# Patient Record
Sex: MALE | Race: BLACK OR AFRICAN AMERICAN | URBAN - METROPOLITAN AREA
[De-identification: names, ages, dates, MRNs, and addresses within clinical notes are randomized per-mention and may not be internally consistent; named-entity substitution may affect disease eponyms.]

---

## 2021-08-13 ENCOUNTER — HOSPITAL ENCOUNTER (OUTPATIENT)
Age: 76
Setting detail: SPECIMEN
Discharge: HOME | End: 2021-08-13
Payer: MEDICARE

## 2021-08-23 LAB — SURGICAL PATHOLOGY REPORT: NORMAL

## 2023-01-23 ENCOUNTER — HOSPITAL ENCOUNTER (OUTPATIENT)
Dept: VASCULAR LAB | Age: 78
Discharge: HOME | End: 2023-01-23
Payer: MEDICARE

## 2023-01-23 DIAGNOSIS — I65.23 BILATERAL CAROTID ARTERY STENOSIS: ICD-10-CM

## 2023-01-23 PROCEDURE — 93880 EXTRACRANIAL BILAT STUDY: CPT

## 2023-01-31 ENCOUNTER — HOSPITAL ENCOUNTER (OUTPATIENT)
Dept: GENERAL RADIOLOGY | Age: 78
Discharge: HOME | End: 2023-02-02
Payer: MEDICARE

## 2023-01-31 ENCOUNTER — HOSPITAL ENCOUNTER (OUTPATIENT)
Dept: PREADMISSION TESTING | Age: 78
Discharge: HOME | End: 2023-02-04
Payer: MEDICARE

## 2023-01-31 VITALS
HEIGHT: 66 IN | RESPIRATION RATE: 14 BRPM | BODY MASS INDEX: 26.84 KG/M2 | TEMPERATURE: 98.7 F | HEART RATE: 73 BPM | SYSTOLIC BLOOD PRESSURE: 130 MMHG | OXYGEN SATURATION: 100 % | DIASTOLIC BLOOD PRESSURE: 86 MMHG | WEIGHT: 167 LBS

## 2023-01-31 LAB
ABO/RH: NORMAL
ABSOLUTE EOS #: 0.14 K/UL (ref 0–0.44)
ABSOLUTE IMMATURE GRANULOCYTE: 0 K/UL (ref 0–0.3)
ABSOLUTE LYMPH #: 0.61 K/UL (ref 1.1–3.7)
ABSOLUTE MONO #: 0.31 K/UL (ref 0.1–1.2)
ALBUMIN SERPL-MCNC: 4.6 G/DL (ref 3.5–5.2)
ALP BLD-CCNC: 74 U/L (ref 40–129)
ALT SERPL-CCNC: 8 U/L (ref 5–41)
ANION GAP SERPL CALCULATED.3IONS-SCNC: 9 MMOL/L (ref 9–17)
ANTIBODY SCREEN: NEGATIVE
ARM BAND NUMBER: NORMAL
AST SERPL-CCNC: 14 U/L
BASOPHILS # BLD: 1 % (ref 0–2)
BASOPHILS ABSOLUTE: 0.03 K/UL (ref 0–0.2)
BILIRUB SERPL-MCNC: 0.3 MG/DL (ref 0.3–1.2)
BILIRUBIN URINE: NEGATIVE
BUN BLDV-MCNC: 7 MG/DL (ref 8–23)
BUN/CREAT BLD: 9 (ref 9–20)
CALCIUM SERPL-MCNC: 9 MG/DL (ref 8.6–10.4)
CHLORIDE BLD-SCNC: 96 MMOL/L (ref 98–107)
CO2: 28 MMOL/L (ref 20–31)
COLOR: YELLOW
CREAT SERPL-MCNC: 0.74 MG/DL (ref 0.7–1.2)
EOSINOPHILS RELATIVE PERCENT: 4 % (ref 1–4)
ESTIMATED AVERAGE GLUCOSE: 108 MG/DL
EXPIRATION DATE: NORMAL
GFR SERPL CREATININE-BSD FRML MDRD: >60 ML/MIN/1.73M2
GLUCOSE BLD-MCNC: 113 MG/DL (ref 75–110)
GLUCOSE BLD-MCNC: 53 MG/DL (ref 70–99)
GLUCOSE URINE: NEGATIVE
HBA1C MFR BLD: 5.4 % (ref 4–6)
HCT VFR BLD CALC: 44.4 % (ref 40.7–50.3)
HEMOGLOBIN: 13.3 G/DL (ref 13–17)
IMMATURE GRANULOCYTES: 0 %
INR BLD: 1.1
KETONES, URINE: NEGATIVE
LEUKOCYTE ESTERASE, URINE: NEGATIVE
LYMPHOCYTES # BLD: 18 % (ref 24–43)
MCH RBC QN AUTO: 23.9 PG (ref 25.2–33.5)
MCHC RBC AUTO-ENTMCNC: 30 G/DL (ref 28.4–34.8)
MCV RBC AUTO: 79.9 FL (ref 82.6–102.9)
MONOCYTES # BLD: 9 % (ref 3–12)
MORPHOLOGY: ABNORMAL
NITRITE, URINE: NEGATIVE
NRBC AUTOMATED: 0 PER 100 WBC
PARTIAL THROMBOPLASTIN TIME: 29.3 SEC (ref 23.9–33.8)
PDW BLD-RTO: 21.2 % (ref 11.8–14.4)
PH UA: 6.5 (ref 5–8)
PLATELET # BLD: 158 K/UL (ref 138–453)
PMV BLD AUTO: 9.4 FL (ref 8.1–13.5)
POTASSIUM SERPL-SCNC: 4.5 MMOL/L (ref 3.7–5.3)
PROTEIN UA: NEGATIVE
PROTHROMBIN TIME: 14.1 SEC (ref 11.5–14.2)
RBC # BLD: 5.56 M/UL (ref 4.21–5.77)
SEG NEUTROPHILS: 68 % (ref 36–65)
SEGMENTED NEUTROPHILS ABSOLUTE COUNT: 2.31 K/UL (ref 1.5–8.1)
SODIUM BLD-SCNC: 133 MMOL/L (ref 135–144)
SPECIFIC GRAVITY UA: 1.01 (ref 1–1.03)
TOTAL PROTEIN: 7.7 G/DL (ref 6.4–8.3)
TURBIDITY: CLEAR
URINE HGB: NEGATIVE
UROBILINOGEN, URINE: NORMAL
WBC # BLD: 3.4 K/UL (ref 3.5–11.3)

## 2023-01-31 PROCEDURE — 86900 BLOOD TYPING SEROLOGIC ABO: CPT

## 2023-01-31 PROCEDURE — 71046 X-RAY EXAM CHEST 2 VIEWS: CPT

## 2023-01-31 PROCEDURE — 82947 ASSAY GLUCOSE BLOOD QUANT: CPT

## 2023-01-31 PROCEDURE — 83036 HEMOGLOBIN GLYCOSYLATED A1C: CPT

## 2023-01-31 PROCEDURE — 80053 COMPREHEN METABOLIC PANEL: CPT

## 2023-01-31 PROCEDURE — 85025 COMPLETE CBC W/AUTO DIFF WBC: CPT

## 2023-01-31 PROCEDURE — 86901 BLOOD TYPING SEROLOGIC RH(D): CPT

## 2023-01-31 PROCEDURE — 86850 RBC ANTIBODY SCREEN: CPT

## 2023-01-31 PROCEDURE — 36415 COLL VENOUS BLD VENIPUNCTURE: CPT

## 2023-01-31 PROCEDURE — 85610 PROTHROMBIN TIME: CPT

## 2023-01-31 PROCEDURE — 81003 URINALYSIS AUTO W/O SCOPE: CPT

## 2023-01-31 PROCEDURE — 85730 THROMBOPLASTIN TIME PARTIAL: CPT

## 2023-01-31 RX ORDER — FAMOTIDINE 40 MG/1
1 TABLET, FILM COATED ORAL NIGHTLY PRN
COMMUNITY
Start: 2023-01-18

## 2023-01-31 RX ORDER — DULOXETIN HYDROCHLORIDE 30 MG/1
30 CAPSULE, DELAYED RELEASE ORAL NIGHTLY
COMMUNITY

## 2023-01-31 RX ORDER — PAROXETINE HYDROCHLORIDE 20 MG/1
1 TABLET, FILM COATED ORAL DAILY
COMMUNITY
Start: 2023-01-27

## 2023-01-31 RX ORDER — LISINOPRIL 40 MG/1
40 TABLET ORAL DAILY
COMMUNITY

## 2023-01-31 RX ORDER — TAMSULOSIN HYDROCHLORIDE 0.4 MG/1
0.8 CAPSULE ORAL DAILY
COMMUNITY
Start: 2014-09-02

## 2023-01-31 RX ORDER — TRAZODONE HYDROCHLORIDE 50 MG/1
50 TABLET ORAL NIGHTLY
COMMUNITY
Start: 2022-03-17

## 2023-01-31 RX ORDER — OMEPRAZOLE 40 MG/1
1 CAPSULE, DELAYED RELEASE ORAL DAILY
COMMUNITY
Start: 2022-12-11

## 2023-01-31 RX ORDER — ALPRAZOLAM 0.25 MG/1
0.25 TABLET ORAL NIGHTLY PRN
COMMUNITY

## 2023-01-31 RX ORDER — ACETAMINOPHEN 325 MG/1
650 TABLET ORAL EVERY 6 HOURS PRN
COMMUNITY

## 2023-01-31 RX ORDER — NITROGLYCERIN 0.4 MG/1
0.4 TABLET SUBLINGUAL EVERY 5 MIN PRN
COMMUNITY

## 2023-01-31 RX ORDER — DOXYCYCLINE HYCLATE 50 MG/1
1 CAPSULE, GELATIN COATED ORAL DAILY
COMMUNITY
Start: 2023-01-06

## 2023-01-31 RX ORDER — AMLODIPINE BESYLATE 10 MG/1
10 TABLET ORAL DAILY
COMMUNITY

## 2023-01-31 RX ORDER — SIMVASTATIN 40 MG
40 TABLET ORAL NIGHTLY
COMMUNITY

## 2023-01-31 RX ORDER — ESCITALOPRAM OXALATE 10 MG/1
1 TABLET ORAL DAILY
COMMUNITY
Start: 2023-01-23

## 2023-01-31 RX ORDER — OXYBUTYNIN CHLORIDE 5 MG/1
5 TABLET, EXTENDED RELEASE ORAL DAILY
COMMUNITY
Start: 2022-07-22

## 2023-01-31 RX ORDER — POTASSIUM CHLORIDE 750 MG/1
10 TABLET, EXTENDED RELEASE ORAL DAILY
COMMUNITY
Start: 2022-07-22

## 2023-01-31 RX ORDER — FUROSEMIDE 20 MG/1
20 TABLET ORAL DAILY
COMMUNITY
Start: 2022-07-22

## 2023-01-31 RX ORDER — ASPIRIN 81 MG/1
81 TABLET ORAL DAILY
COMMUNITY
Start: 2022-03-17

## 2023-01-31 RX ORDER — DICYCLOMINE HYDROCHLORIDE 10 MG/1
1 CAPSULE ORAL 3 TIMES DAILY PRN
COMMUNITY
Start: 2023-01-18

## 2023-01-31 RX ORDER — METOPROLOL SUCCINATE 100 MG/1
100 TABLET, EXTENDED RELEASE ORAL DAILY
COMMUNITY
Start: 2022-07-22

## 2023-01-31 NOTE — PROGRESS NOTES
Patient's blood glucose resulted at 53 on lab work performed in [facility]. Patient denies symptoms of hypoglycemia. Patient given a bagel and lemonade, and blood glucose rechecked 30 minutes later with a result of 113.

## 2023-01-31 NOTE — PRE-PROCEDURE INSTRUCTIONS
On the Day of Your Surgery, Tuesday, February 14, 2023, Please Arrive At 10:20 AM     Enter the hospital through the Main Entrance, take the lobby elevators to the second floor and check in at the Surgery Registration desk. Continue to take your home medications as you normally do up to and including the night before surgery with the exception of blood thinning medications. Blood Thinning Medications:  Please stop prescription blood thinning medications such as Apixaban (Eliquis); Clopidogrel (Plavix); Dabigatran (Pradaxa); Prasugrel (Effient); Rivaroxaban (Xarelto); Ticagrelor (Brilinta); Warfarin (Coumadin) only as directed by your surgeon and/or the prescribing physician    Some common examples of other medications that can thin your blood are: Aspirin, Ibuprofen (Advil, Motrin), Naproxen (Aleve), Meloxicam (Mobic), Celecoxib (Celebrex), Fish Oil, many Herbal Supplements. These medications should usually be stopped at least 7 days prior to surgery. Please call Dr. El Sales office regarding when to stop taking aspirin prior to surgery. Tylenol is OK to take for pain the week prior to surgery. Failure to stop certain medications may interfere with your scheduled surgery. If you receive instructions from your surgeon regarding what medications to stop prior to surgery, please follow those specific instructions. If You Have Diabetes:  Do not take any of your diabetic medications, (injectables or by mouth) the morning of surgery unless otherwise instructed by the doctor who manages your diabetes. If you are taking insulin, contact the doctor the manages your diabetes for instructions about any changes to your insulin dosages the day before surgery. Please take the following medication(s) the day of surgery with small sips of water:              Escitalopram, paroxetine, metoprolol, amlodipine, omeprazole    Showering Before Surgery:      You can play an important role in your own health by carefully washing before surgery. Shower the night before and the morning of surgery using the instructions below. If you are allergic to Chlorhexidine Gluconate (CHG) use antibacterial soap instead. If you were given Chlorhexidine soap, please follow the instructions included with the soap to shower the night before and the morning of surgery. If you were not given Chlorhexidine, please shower or bathe the morning of surgery using an antibacterial soap. Please dress in clean clothing after showering. General information about Chlorhexidine Gluconate (CHG)   * It should not be used on hair, face, ears, genital area or skin that is not intact. * It should not be used if breast feeding. * It should not be used if you allergic to CHG. * See the bottle for additional information. Do Not apply any powder, deodorant, lotion/cream/oil, perfume/aftershave, cosmetics, or alcohol-based skin or hair products after showering. Do Not shave near the planned surgical site unless specifically instructed to.     1. Wash your hair using your normal shampoo and rinse. 2. Wash your face and genital area (privates) using your own shampoo and rinse. 3. Turn off the shower water and pour half of the bottle of CHG onto a clean washcloth. 4. Wash your body from neck down to toes. Pay special attention to your surgical site. 5. Leave the CHG on your skin for 5 minutes and rinse it off.   6. Pat dry with a clean towel, sleep in clean clothes and clean sheets. 7. Do not shave near the surgical site. 8. Repeat process the morning of surgery. CHG may cause dry skin, but should not cause redness, rash, or intense itching. If an suspect an allergic reaction, use your own soap and shampoo for the morning of surgery and report reaction to the pre-operative nurse. Additional Instructions:      1.  If you are having any type of anesthesia, you are to have NOTHING to eat or drink after midnight the night before surgery.  This includes no gum, hard candy, mints or water.  The only exception to this is small sips of water to take the medications listed above.  No smoking or chewing tobacco after midnight.  No alcoholic beverages for 24 hours prior to surgery.  2. Brush your teeth but do not swallow any water.  3. If you wear glasses bring a case for them if you have one.  No contacts should be worn the day of surgery.  You may also bring your hearing aids. If you have dentures, most surgical procedures involving anesthesia will require that you remove them prior to surgery.  4. If you sleep with a CPAP or BiPAP machine at home and plan on staying in the hospital overnight after surgery, please bring your machine with you.   5. Do not wear any jewelry or body piercings the day of surgery.  No nail polish on the operative extremity (arm/hand or leg/foot surgeries)   6. If you are staying overnight with us, you may bring a small bag of necessary personal items. Please bring your home medications with you in their original labeled containers.   7. Please wear loose, comfortable clothing.  If you are potentially going to have a cast, sling, brace or bulky dressing, make sure to wear clothing that will fit over it.   8. In case of illness - If you have cold or flu like symptoms (high fever, runny nose, sore throat, cough, etc.) rash, nausea, vomiting, loose stools, and/or recent contact with someone who has a contagious disease (chicken pox, measles, COVID-19, etc.).  Please call your surgeon before coming to the hospital.    Transportation After Your Surgery/Procedure:     If you are going home the same day of surgery you need someone to drive you home.  Your  must be at least 18 years of age.  A taxi cab or other nonmedical public transportation is not acceptable unless you have someone to ride home in the vehicle with you.   For your safety, someone must remain with you for the first 24 hours after your surgery if  you receive anesthesia or medication. If you do not have someone to stay with you, your procedure may be cancelled. As a patient at [address] you can expect quality medical and nursing care that is centered on you individual needs. Our goal is to make your surgical experience as comfortable as possible.     Any questions about preparing for your surgery please call [phone].      ____________________________   ____________________________  Signature (Patient)                                 Signature (Nurse)                     Date

## 2023-01-31 NOTE — PROGRESS NOTES
PAT Progress Note    Pt Name: Robin Bojorquez  MRN: [id number]  YOB: 1945  Date of evaluation: 1/31/2023      [x] Called to PAT. I spoke to the patient, Robin Bojorquez, a 68 y.o. male, who is scheduled for an upcoming ABDOMINAL AORTIC ANEURYSM REPAIR ENDOVASCULAR by Nandini Lion MD for Abdominal aortic aneurysm (AAA) without rupture, unspecified part [I71.40] on 2/14/2023 at 1220. [x] I reviewed the hard copy vascular progress note by Dr. Hamzah Teran dated 1/16/2023 for an Interval History and Physical Note the day of surgery. History of congestive heart failure, coronary artery disease, hypertension, hyperlipidemia, diabetes, stent to LAD (2007). Patient follows with cardiologist Dr. Caron Byrd and evaluated 12/2022. Patient denies shortness of breath, chest pain, palpitations, dizziness. Patient had low blood sugar of 53 today in PAT. Patient given food and drink. Recheck blood sugar is 113. Patient asymptomatic. Functional Capacity per pt:  1) Pt is able to walk 2 city blocks on level ground without SOB. 2) Pt is not able to climb 2 flights of stairs without SOB. Vital signs: /86   Pulse 73   Temp 98.7 °F (37.1 °C) (Infrared)   Resp 14   Ht 5' 6\" (1.676 m)   Wt 167 lb (75.8 kg)   SpO2 100%   BMI 26.95 kg/m²     Physical Exam:     General Appearance:  Alert, well appearing, and in no acute distress. Mental status:  Oriented to person, place, and time. Lungs:  Bilateral equal air entry, clear to auscultation, no wheezing, rales or rhonchi, and normal effort. Cardiovascular:  Normal rate, regular rhythm, no murmur, gallop, or rub.     Past Medical History:     Past Medical History:   Diagnosis Date    AAA (abdominal aortic aneurysm)     Scheduled for surgery 2/14/23    Anxiety with depression     On Rx; wife passed away in June and has had issues with this since    Arthritis     Knees, foot    Kirby's esophagus     BPH (benign prostatic hyperplasia)     CAD (coronary artery disease) Diabetes mellitus ([facility] [place] [facility]     Type 2, checks blood sugar twice daily, managed by PCP    Dizziness     Usually in the morning    History of blood transfusion 2022    [facility]; patient states he was admitted after a syncopal episode and received blood transfusions    Hospitalization or health care facility admission within last 6 months 09/2022    [facility] (blood in stool)    Hyperlipidemia     Hypertension     IBS (irritable bowel syndrome)     Alternates constipation and diarrhea    Neuropathy     Left leg and foot d/t herniated disc in spine per pt.     Nodule of middle lobe of right lung 10/26/2022    5mm (found on CT)    Under care of team     Cardiology - Dr. Mauro Eid        Investigations:      Laboratory Testing:  Recent Results (from the past 24 hour(s))   Urinalysis with Reflex to Culture    Collection Time: 01/31/23 10:30 AM    Specimen: Urine   Result Value Ref Range    Color, UA Yellow Yellow    Turbidity UA Clear Clear    Glucose, Ur NEGATIVE NEGATIVE    Bilirubin Urine NEGATIVE NEGATIVE    Ketones, Urine NEGATIVE NEGATIVE    Specific Gravity, UA 1.006 1.005 - 1.030    Urine Hgb NEGATIVE NEGATIVE    pH, UA 6.5 5.0 - 8.0    Protein, UA NEGATIVE NEGATIVE    Urobilinogen, Urine Normal Normal    Nitrite, Urine NEGATIVE NEGATIVE    Leukocyte Esterase, Urine NEGATIVE NEGATIVE   CBC with Auto Differential    Collection Time: 01/31/23 10:42 AM   Result Value Ref Range    WBC 3.4 (L) 3.5 - 11.3 k/uL    RBC 5.56 4.21 - 5.77 m/uL    Hemoglobin 13.3 13.0 - 17.0 g/dL    Hematocrit 44.4 40.7 - 50.3 %    MCV 79.9 (L) 82.6 - 102.9 fL    MCH 23.9 (L) 25.2 - 33.5 pg    MCHC 30.0 28.4 - 34.8 g/dL    RDW 21.2 (H) 11.8 - 14.4 %    Platelets 090 910 - 579 k/uL    MPV 9.4 8.1 - 13.5 fL    NRBC Automated 0.0 0.0 per 100 WBC    Seg Neutrophils 68 (H) 36 - 65 %    Lymphocytes 18 (L) 24 - 43 %    Monocytes 9 3 - 12 %    Eosinophils % 4 1 - 4 %    Basophils 1 0 - 2 %    Immature Granulocytes 0 0 %    Segs Absolute 2.31 1.50 - 8.10 k/uL Absolute Lymph # 0.61 (L) 1.10 - 3.70 k/uL    Absolute Mono # 0.31 0.10 - 1.20 k/uL    Absolute Eos # 0.14 0.00 - 0.44 k/uL    Basophils Absolute 0.03 0.00 - 0.20 k/uL    Absolute Immature Granulocyte 0.00 0.00 - 0.30 k/uL    Morphology ANISOCYTOSIS PRESENT    Protime-INR    Collection Time: 01/31/23 10:42 AM   Result Value Ref Range    Protime 14.1 11.5 - 14.2 sec    INR 1.1    APTT    Collection Time: 01/31/23 10:42 AM   Result Value Ref Range    PTT 29.3 23.9 - 33.8 sec   Comprehensive Metabolic Panel    Collection Time: 01/31/23 10:42 AM   Result Value Ref Range    Glucose 53 (L) 70 - 99 mg/dL    BUN 7 (L) 8 - 23 mg/dL    Creatinine 0.74 0.70 - 1.20 mg/dL    Est, Glom Filt Rate >60 >60 mL/min/1.73m2    Bun/Cre Ratio 9 9 - 20    Calcium 9.0 8.6 - 10.4 mg/dL    Sodium 133 (L) 135 - 144 mmol/L    Potassium 4.5 3.7 - 5.3 mmol/L    Chloride 96 (L) 98 - 107 mmol/L    CO2 28 20 - 31 mmol/L    Anion Gap 9 9 - 17 mmol/L    Alkaline Phosphatase 74 40 - 129 U/L    ALT 8 5 - 41 U/L    AST 14 <40 U/L    Total Bilirubin 0.3 0.3 - 1.2 mg/dL    Total Protein 7.7 6.4 - 8.3 g/dL    Albumin 4.6 3.5 - 5.2 g/dL   POC Glucose Fingerstick    Collection Time: 01/31/23 11:44 AM   Result Value Ref Range    POC Glucose 113 (H) 75 - 110 mg/dL       Recent Labs     01/31/23  1042   HGB 13.3   HCT 44.4   WBC 3.4*   MCV 79.9*   *   K 4.5   CL 96*   CO2 28   BUN 7*   CREATININE 0.74   GLUCOSE 53*   INR 1.1   PROTIME 14.1   APTT 29.3   AST 14   ALT 8   LABALBU 4.6       No results for input(s): COVID19 in the last 720 hours.     DURAN Johns - CNP    Electronically signed 1/31/2023 at 12:56 PM

## 2023-02-01 ENCOUNTER — ANESTHESIA EVENT (OUTPATIENT)
Dept: OPERATING ROOM | Age: 78
DRG: 272 | End: 2023-02-01
Payer: MEDICARE

## 2023-02-14 ENCOUNTER — HOSPITAL ENCOUNTER (INPATIENT)
Age: 78
LOS: 1 days | Discharge: HOME | DRG: 272 | End: 2023-02-15
Attending: SURGERY | Admitting: SURGERY
Payer: MEDICARE

## 2023-02-14 ENCOUNTER — ANESTHESIA (OUTPATIENT)
Dept: OPERATING ROOM | Age: 78
DRG: 272 | End: 2023-02-14
Payer: MEDICARE

## 2023-02-14 PROBLEM — I72.3 ILIAC ARTERY ANEURYSM, LEFT (HCC): Status: ACTIVE | Noted: 2023-02-14

## 2023-02-14 LAB
GLUCOSE BLD-MCNC: 115 MG/DL (ref 75–110)
GLUCOSE BLD-MCNC: 144 MG/DL (ref 75–110)
HCT VFR BLD AUTO: 43.6 % (ref 40.7–50.3)
HGB BLD-MCNC: 13.1 G/DL (ref 13–17)
MCH RBC QN AUTO: 25 PG (ref 25.2–33.5)
MCHC RBC AUTO-ENTMCNC: 30 G/DL (ref 28.4–34.8)
MCV RBC AUTO: 83.4 FL (ref 82.6–102.9)
NRBC AUTOMATED: 0 PER 100 WBC
PDW BLD-RTO: 21.1 % (ref 11.8–14.4)
PLATELET # BLD AUTO: 134 K/UL (ref 138–453)
PMV BLD AUTO: 10.5 FL (ref 8.1–13.5)
RBC # BLD: 5.23 M/UL (ref 4.21–5.77)
WBC # BLD AUTO: 6.1 K/UL (ref 3.5–11.3)

## 2023-02-14 PROCEDURE — 2780000010 HC IMPLANT OTHER: Performed by: SURGERY

## 2023-02-14 PROCEDURE — C1874 STENT, COATED/COV W/DEL SYS: HCPCS | Performed by: SURGERY

## 2023-02-14 PROCEDURE — 82947 ASSAY GLUCOSE BLOOD QUANT: CPT

## 2023-02-14 PROCEDURE — 6360000002 HC RX W HCPCS: Performed by: SURGERY

## 2023-02-14 PROCEDURE — 6370000000 HC RX 637 (ALT 250 FOR IP): Performed by: NURSE PRACTITIONER

## 2023-02-14 PROCEDURE — 2709999900 HC NON-CHARGEABLE SUPPLY: Performed by: SURGERY

## 2023-02-14 PROCEDURE — 6360000002 HC RX W HCPCS: Performed by: NURSE ANESTHETIST, CERTIFIED REGISTERED

## 2023-02-14 PROCEDURE — 7100000000 HC PACU RECOVERY - FIRST 15 MIN: Performed by: SURGERY

## 2023-02-14 PROCEDURE — 2580000003 HC RX 258: Performed by: STUDENT IN AN ORGANIZED HEALTH CARE EDUCATION/TRAINING PROGRAM

## 2023-02-14 PROCEDURE — A4217 STERILE WATER/SALINE, 500 ML: HCPCS | Performed by: SURGERY

## 2023-02-14 PROCEDURE — 047D3ZZ DILATION OF LEFT COMMON ILIAC ARTERY, PERCUTANEOUS APPROACH: ICD-10-PCS | Performed by: SURGERY

## 2023-02-14 PROCEDURE — 2500000003 HC RX 250 WO HCPCS: Performed by: NURSE ANESTHETIST, CERTIFIED REGISTERED

## 2023-02-14 PROCEDURE — 36415 COLL VENOUS BLD VENIPUNCTURE: CPT

## 2023-02-14 PROCEDURE — 85027 COMPLETE CBC AUTOMATED: CPT

## 2023-02-14 PROCEDURE — 6360000002 HC RX W HCPCS: Performed by: ANESTHESIOLOGY

## 2023-02-14 PROCEDURE — C1725 CATH, TRANSLUMIN NON-LASER: HCPCS | Performed by: SURGERY

## 2023-02-14 PROCEDURE — C1768 GRAFT, VASCULAR: HCPCS | Performed by: SURGERY

## 2023-02-14 PROCEDURE — 3600000002 HC SURGERY LEVEL 2 BASE: Performed by: SURGERY

## 2023-02-14 PROCEDURE — 3700000000 HC ANESTHESIA ATTENDED CARE: Performed by: SURGERY

## 2023-02-14 PROCEDURE — 6360000002 HC RX W HCPCS

## 2023-02-14 PROCEDURE — C1894 INTRO/SHEATH, NON-LASER: HCPCS | Performed by: SURGERY

## 2023-02-14 PROCEDURE — C1889 IMPLANT/INSERT DEVICE, NOC: HCPCS | Performed by: SURGERY

## 2023-02-14 PROCEDURE — 6360000004 HC RX CONTRAST MEDICATION

## 2023-02-14 PROCEDURE — C1773 RET DEV, INSERTABLE: HCPCS | Performed by: SURGERY

## 2023-02-14 PROCEDURE — 2000000000 HC ICU R&B

## 2023-02-14 PROCEDURE — 3600000012 HC SURGERY LEVEL 2 ADDTL 15MIN: Performed by: SURGERY

## 2023-02-14 PROCEDURE — 6370000000 HC RX 637 (ALT 250 FOR IP): Performed by: SURGERY

## 2023-02-14 PROCEDURE — C1760 CLOSURE DEV, VASC: HCPCS | Performed by: SURGERY

## 2023-02-14 PROCEDURE — C1769 GUIDE WIRE: HCPCS | Performed by: SURGERY

## 2023-02-14 PROCEDURE — B41J1ZZ FLUOROSCOPY OF OTHER LOWER ARTERIES USING LOW OSMOLAR CONTRAST: ICD-10-PCS | Performed by: SURGERY

## 2023-02-14 PROCEDURE — C1887 CATHETER, GUIDING: HCPCS | Performed by: SURGERY

## 2023-02-14 PROCEDURE — 6360000004 HC RX CONTRAST MEDICATION: Performed by: SURGERY

## 2023-02-14 PROCEDURE — 7100000001 HC PACU RECOVERY - ADDTL 15 MIN: Performed by: SURGERY

## 2023-02-14 PROCEDURE — 2580000003 HC RX 258: Performed by: SURGERY

## 2023-02-14 PROCEDURE — 2580000003 HC RX 258: Performed by: NURSE ANESTHETIST, CERTIFIED REGISTERED

## 2023-02-14 PROCEDURE — 2720000010 HC SURG SUPPLY STERILE: Performed by: SURGERY

## 2023-02-14 PROCEDURE — 04LD3DZ OCCLUSION OF LEFT COMMON ILIAC ARTERY WITH INTRALUMINAL DEVICE, PERCUTANEOUS APPROACH: ICD-10-PCS | Performed by: SURGERY

## 2023-02-14 PROCEDURE — 3700000001 HC ADD 15 MINUTES (ANESTHESIA): Performed by: SURGERY

## 2023-02-14 DEVICE — FIBERED IDC™ OCCLUSION SYSTEM
Type: IMPLANTABLE DEVICE | Site: GROIN | Status: FUNCTIONAL
Brand: INTERLOCK™-35

## 2023-02-14 DEVICE — GRAFT EVAR L103MM DIA25X14MM CATH 18FR NIT HI DENS: Type: IMPLANTABLE DEVICE | Site: GROIN | Status: FUNCTIONAL

## 2023-02-14 DEVICE — GRAFT EVAR 14FR L124MM DIA16X10MM HI DENS MULTIFILAMENT: Type: IMPLANTABLE DEVICE | Site: GROIN | Status: FUNCTIONAL

## 2023-02-14 DEVICE — IMPLANTABLE DEVICE: Type: IMPLANTABLE DEVICE | Site: GROIN | Status: FUNCTIONAL

## 2023-02-14 RX ORDER — ALPRAZOLAM 0.25 MG/1
0.25 TABLET ORAL NIGHTLY PRN
Status: DISCONTINUED | OUTPATIENT
Start: 2023-02-14 | End: 2023-02-14

## 2023-02-14 RX ORDER — NITROGLYCERIN 0.4 MG/1
0.4 TABLET SUBLINGUAL EVERY 5 MIN PRN
Status: DISCONTINUED | OUTPATIENT
Start: 2023-02-14 | End: 2023-02-15 | Stop reason: HOSPADM

## 2023-02-14 RX ORDER — SODIUM CHLORIDE 9 MG/ML
INJECTION, SOLUTION INTRAVENOUS PRN
Status: DISCONTINUED | OUTPATIENT
Start: 2023-02-14 | End: 2023-02-14 | Stop reason: HOSPADM

## 2023-02-14 RX ORDER — SODIUM CHLORIDE 9 MG/ML
INJECTION, SOLUTION INTRAVENOUS PRN
Status: DISCONTINUED | OUTPATIENT
Start: 2023-02-14 | End: 2023-02-15 | Stop reason: HOSPADM

## 2023-02-14 RX ORDER — SODIUM CHLORIDE 0.9 % (FLUSH) 0.9 %
5-40 SYRINGE (ML) INJECTION EVERY 12 HOURS SCHEDULED
Status: DISCONTINUED | OUTPATIENT
Start: 2023-02-14 | End: 2023-02-14 | Stop reason: HOSPADM

## 2023-02-14 RX ORDER — OXYCODONE HYDROCHLORIDE 5 MG/1
5 TABLET ORAL
Status: DISCONTINUED | OUTPATIENT
Start: 2023-02-14 | End: 2023-02-14 | Stop reason: HOSPADM

## 2023-02-14 RX ORDER — POTASSIUM CHLORIDE 7.45 MG/ML
10 INJECTION INTRAVENOUS PRN
Status: DISCONTINUED | OUTPATIENT
Start: 2023-02-14 | End: 2023-02-15 | Stop reason: HOSPADM

## 2023-02-14 RX ORDER — LIDOCAINE HYDROCHLORIDE 20 MG/ML
INJECTION, SOLUTION EPIDURAL; INFILTRATION; INTRACAUDAL; PERINEURAL PRN
Status: DISCONTINUED | OUTPATIENT
Start: 2023-02-14 | End: 2023-02-14 | Stop reason: SDUPTHER

## 2023-02-14 RX ORDER — SODIUM CHLORIDE 9 MG/ML
INJECTION, SOLUTION INTRAVENOUS CONTINUOUS PRN
Status: DISCONTINUED | OUTPATIENT
Start: 2023-02-14 | End: 2023-02-14 | Stop reason: SDUPTHER

## 2023-02-14 RX ORDER — MAGNESIUM SULFATE 1 G/100ML
1000 INJECTION INTRAVENOUS PRN
Status: DISCONTINUED | OUTPATIENT
Start: 2023-02-14 | End: 2023-02-15 | Stop reason: HOSPADM

## 2023-02-14 RX ORDER — LIDOCAINE HYDROCHLORIDE 10 MG/ML
1 INJECTION, SOLUTION EPIDURAL; INFILTRATION; INTRACAUDAL; PERINEURAL
Status: DISCONTINUED | OUTPATIENT
Start: 2023-02-14 | End: 2023-02-14 | Stop reason: HOSPADM

## 2023-02-14 RX ORDER — SODIUM CHLORIDE 0.9 % (FLUSH) 0.9 %
5-40 SYRINGE (ML) INJECTION PRN
Status: DISCONTINUED | OUTPATIENT
Start: 2023-02-14 | End: 2023-02-15 | Stop reason: HOSPADM

## 2023-02-14 RX ORDER — POTASSIUM CHLORIDE 20 MEQ/1
40 TABLET, EXTENDED RELEASE ORAL PRN
Status: DISCONTINUED | OUTPATIENT
Start: 2023-02-14 | End: 2023-02-15 | Stop reason: HOSPADM

## 2023-02-14 RX ORDER — HYDROMORPHONE HYDROCHLORIDE 1 MG/ML
0.25 INJECTION, SOLUTION INTRAMUSCULAR; INTRAVENOUS; SUBCUTANEOUS EVERY 5 MIN PRN
Status: DISCONTINUED | OUTPATIENT
Start: 2023-02-14 | End: 2023-02-14 | Stop reason: HOSPADM

## 2023-02-14 RX ORDER — SODIUM CHLORIDE, SODIUM LACTATE, POTASSIUM CHLORIDE, CALCIUM CHLORIDE 600; 310; 30; 20 MG/100ML; MG/100ML; MG/100ML; MG/100ML
INJECTION, SOLUTION INTRAVENOUS CONTINUOUS
Status: DISCONTINUED | OUTPATIENT
Start: 2023-02-14 | End: 2023-02-15 | Stop reason: HOSPADM

## 2023-02-14 RX ORDER — SODIUM CHLORIDE, SODIUM LACTATE, POTASSIUM CHLORIDE, CALCIUM CHLORIDE 600; 310; 30; 20 MG/100ML; MG/100ML; MG/100ML; MG/100ML
INJECTION, SOLUTION INTRAVENOUS CONTINUOUS
Status: DISCONTINUED | OUTPATIENT
Start: 2023-02-14 | End: 2023-02-14

## 2023-02-14 RX ORDER — PROPOFOL 10 MG/ML
INJECTION, EMULSION INTRAVENOUS PRN
Status: DISCONTINUED | OUTPATIENT
Start: 2023-02-14 | End: 2023-02-14 | Stop reason: SDUPTHER

## 2023-02-14 RX ORDER — DICYCLOMINE HYDROCHLORIDE 10 MG/1
10 CAPSULE ORAL 3 TIMES DAILY PRN
Status: DISCONTINUED | OUTPATIENT
Start: 2023-02-14 | End: 2023-02-15 | Stop reason: HOSPADM

## 2023-02-14 RX ORDER — PROTAMINE SULFATE 10 MG/ML
INJECTION, SOLUTION INTRAVENOUS PRN
Status: DISCONTINUED | OUTPATIENT
Start: 2023-02-14 | End: 2023-02-14 | Stop reason: SDUPTHER

## 2023-02-14 RX ORDER — AMLODIPINE BESYLATE 10 MG/1
10 TABLET ORAL DAILY
Status: DISCONTINUED | OUTPATIENT
Start: 2023-02-14 | End: 2023-02-14

## 2023-02-14 RX ORDER — ASPIRIN 81 MG/1
81 TABLET ORAL DAILY
Status: DISCONTINUED | OUTPATIENT
Start: 2023-02-14 | End: 2023-02-14

## 2023-02-14 RX ORDER — TRAZODONE HYDROCHLORIDE 50 MG/1
50 TABLET ORAL NIGHTLY
Status: DISCONTINUED | OUTPATIENT
Start: 2023-02-14 | End: 2023-02-15 | Stop reason: HOSPADM

## 2023-02-14 RX ORDER — HYDROMORPHONE HYDROCHLORIDE 1 MG/ML
0.5 INJECTION, SOLUTION INTRAMUSCULAR; INTRAVENOUS; SUBCUTANEOUS EVERY 5 MIN PRN
Status: COMPLETED | OUTPATIENT
Start: 2023-02-14 | End: 2023-02-14

## 2023-02-14 RX ORDER — ESCITALOPRAM OXALATE 10 MG/1
10 TABLET ORAL DAILY
Status: DISCONTINUED | OUTPATIENT
Start: 2023-02-15 | End: 2023-02-15 | Stop reason: HOSPADM

## 2023-02-14 RX ORDER — OXYBUTYNIN CHLORIDE 5 MG/1
5 TABLET, EXTENDED RELEASE ORAL DAILY
Status: DISCONTINUED | OUTPATIENT
Start: 2023-02-14 | End: 2023-02-14

## 2023-02-14 RX ORDER — DULOXETIN HYDROCHLORIDE 30 MG/1
30 CAPSULE, DELAYED RELEASE ORAL NIGHTLY
Status: DISCONTINUED | OUTPATIENT
Start: 2023-02-14 | End: 2023-02-14

## 2023-02-14 RX ORDER — MORPHINE SULFATE 2 MG/ML
2 INJECTION, SOLUTION INTRAMUSCULAR; INTRAVENOUS
Status: DISCONTINUED | OUTPATIENT
Start: 2023-02-14 | End: 2023-02-15 | Stop reason: HOSPADM

## 2023-02-14 RX ORDER — SODIUM CHLORIDE 0.9 % (FLUSH) 0.9 %
5-40 SYRINGE (ML) INJECTION PRN
Status: DISCONTINUED | OUTPATIENT
Start: 2023-02-14 | End: 2023-02-14 | Stop reason: HOSPADM

## 2023-02-14 RX ORDER — FENTANYL CITRATE 50 UG/ML
INJECTION, SOLUTION INTRAMUSCULAR; INTRAVENOUS PRN
Status: DISCONTINUED | OUTPATIENT
Start: 2023-02-14 | End: 2023-02-14 | Stop reason: SDUPTHER

## 2023-02-14 RX ORDER — MIDAZOLAM HYDROCHLORIDE 1 MG/ML
INJECTION INTRAMUSCULAR; INTRAVENOUS PRN
Status: DISCONTINUED | OUTPATIENT
Start: 2023-02-14 | End: 2023-02-14 | Stop reason: SDUPTHER

## 2023-02-14 RX ORDER — ACETAMINOPHEN 325 MG/1
650 TABLET ORAL EVERY 6 HOURS PRN
Status: DISCONTINUED | OUTPATIENT
Start: 2023-02-14 | End: 2023-02-15 | Stop reason: HOSPADM

## 2023-02-14 RX ORDER — OXYBUTYNIN CHLORIDE 5 MG/1
5 TABLET ORAL 2 TIMES DAILY
Status: DISCONTINUED | OUTPATIENT
Start: 2023-02-14 | End: 2023-02-15 | Stop reason: HOSPADM

## 2023-02-14 RX ORDER — LANOLIN ALCOHOL/MO/W.PET/CERES
325 CREAM (GRAM) TOPICAL DAILY
Status: DISCONTINUED | OUTPATIENT
Start: 2023-02-14 | End: 2023-02-15 | Stop reason: HOSPADM

## 2023-02-14 RX ORDER — PANTOPRAZOLE SODIUM 40 MG/1
40 TABLET, DELAYED RELEASE ORAL
Status: DISCONTINUED | OUTPATIENT
Start: 2023-02-15 | End: 2023-02-15 | Stop reason: HOSPADM

## 2023-02-14 RX ORDER — IODIXANOL 320 MG/ML
INJECTION, SOLUTION INTRAVASCULAR PRN
Status: DISCONTINUED | OUTPATIENT
Start: 2023-02-14 | End: 2023-02-14 | Stop reason: ALTCHOICE

## 2023-02-14 RX ORDER — SODIUM CHLORIDE 0.9 % (FLUSH) 0.9 %
5-40 SYRINGE (ML) INJECTION EVERY 12 HOURS SCHEDULED
Status: DISCONTINUED | OUTPATIENT
Start: 2023-02-14 | End: 2023-02-15 | Stop reason: HOSPADM

## 2023-02-14 RX ORDER — ONDANSETRON 2 MG/ML
4 INJECTION INTRAMUSCULAR; INTRAVENOUS
Status: DISCONTINUED | OUTPATIENT
Start: 2023-02-14 | End: 2023-02-14 | Stop reason: HOSPADM

## 2023-02-14 RX ORDER — LISINOPRIL 40 MG/1
40 TABLET ORAL DAILY
Status: DISCONTINUED | OUTPATIENT
Start: 2023-02-14 | End: 2023-02-15 | Stop reason: HOSPADM

## 2023-02-14 RX ORDER — UBIDECARENONE 75 MG
100 CAPSULE ORAL DAILY
Status: DISCONTINUED | OUTPATIENT
Start: 2023-02-14 | End: 2023-02-15 | Stop reason: HOSPADM

## 2023-02-14 RX ORDER — HYDRALAZINE HYDROCHLORIDE 20 MG/ML
5 INJECTION INTRAMUSCULAR; INTRAVENOUS 3 TIMES DAILY PRN
Status: DISCONTINUED | OUTPATIENT
Start: 2023-02-14 | End: 2023-02-15 | Stop reason: HOSPADM

## 2023-02-14 RX ORDER — ROCURONIUM BROMIDE 10 MG/ML
INJECTION, SOLUTION INTRAVENOUS PRN
Status: DISCONTINUED | OUTPATIENT
Start: 2023-02-14 | End: 2023-02-14 | Stop reason: SDUPTHER

## 2023-02-14 RX ORDER — FUROSEMIDE 20 MG/1
20 TABLET ORAL DAILY
Status: DISCONTINUED | OUTPATIENT
Start: 2023-02-14 | End: 2023-02-15 | Stop reason: HOSPADM

## 2023-02-14 RX ORDER — DEXAMETHASONE SODIUM PHOSPHATE 10 MG/ML
INJECTION, SOLUTION INTRAMUSCULAR; INTRAVENOUS PRN
Status: DISCONTINUED | OUTPATIENT
Start: 2023-02-14 | End: 2023-02-14 | Stop reason: SDUPTHER

## 2023-02-14 RX ORDER — DIPHENHYDRAMINE HYDROCHLORIDE 50 MG/ML
12.5 INJECTION INTRAMUSCULAR; INTRAVENOUS
Status: DISCONTINUED | OUTPATIENT
Start: 2023-02-14 | End: 2023-02-14 | Stop reason: HOSPADM

## 2023-02-14 RX ORDER — ASPIRIN 81 MG/1
81 TABLET ORAL DAILY
Status: DISCONTINUED | OUTPATIENT
Start: 2023-02-15 | End: 2023-02-15 | Stop reason: HOSPADM

## 2023-02-14 RX ORDER — ATORVASTATIN CALCIUM 20 MG/1
20 TABLET, FILM COATED ORAL DAILY
Status: DISCONTINUED | OUTPATIENT
Start: 2023-02-14 | End: 2023-02-15 | Stop reason: HOSPADM

## 2023-02-14 RX ORDER — PAROXETINE HYDROCHLORIDE 20 MG/1
20 TABLET, FILM COATED ORAL DAILY
Status: DISCONTINUED | OUTPATIENT
Start: 2023-02-15 | End: 2023-02-15 | Stop reason: HOSPADM

## 2023-02-14 RX ORDER — ONDANSETRON 2 MG/ML
INJECTION INTRAMUSCULAR; INTRAVENOUS PRN
Status: DISCONTINUED | OUTPATIENT
Start: 2023-02-14 | End: 2023-02-14 | Stop reason: SDUPTHER

## 2023-02-14 RX ORDER — POTASSIUM CHLORIDE 20 MEQ/1
10 TABLET, EXTENDED RELEASE ORAL DAILY
Status: DISCONTINUED | OUTPATIENT
Start: 2023-02-14 | End: 2023-02-15 | Stop reason: HOSPADM

## 2023-02-14 RX ORDER — MORPHINE SULFATE 4 MG/ML
4 INJECTION, SOLUTION INTRAMUSCULAR; INTRAVENOUS
Status: DISCONTINUED | OUTPATIENT
Start: 2023-02-14 | End: 2023-02-15 | Stop reason: HOSPADM

## 2023-02-14 RX ORDER — TAMSULOSIN HYDROCHLORIDE 0.4 MG/1
0.8 CAPSULE ORAL DAILY
Status: DISCONTINUED | OUTPATIENT
Start: 2023-02-14 | End: 2023-02-15 | Stop reason: HOSPADM

## 2023-02-14 RX ORDER — METOPROLOL SUCCINATE 50 MG/1
100 TABLET, EXTENDED RELEASE ORAL DAILY
Status: DISCONTINUED | OUTPATIENT
Start: 2023-02-15 | End: 2023-02-15 | Stop reason: HOSPADM

## 2023-02-14 RX ORDER — HEPARIN SODIUM 1000 [USP'U]/ML
INJECTION, SOLUTION INTRAVENOUS; SUBCUTANEOUS PRN
Status: DISCONTINUED | OUTPATIENT
Start: 2023-02-14 | End: 2023-02-14 | Stop reason: SDUPTHER

## 2023-02-14 RX ORDER — SODIUM CHLORIDE 9 MG/ML
INJECTION, SOLUTION INTRAVENOUS CONTINUOUS
Status: DISCONTINUED | OUTPATIENT
Start: 2023-02-14 | End: 2023-02-14

## 2023-02-14 RX ADMIN — ROCURONIUM BROMIDE 50 MG: 10 INJECTION INTRAVENOUS at 14:03

## 2023-02-14 RX ADMIN — VITAM B12 100 MCG: 100 TAB at 20:24

## 2023-02-14 RX ADMIN — ATORVASTATIN CALCIUM 20 MG: 20 TABLET, FILM COATED ORAL at 20:22

## 2023-02-14 RX ADMIN — SODIUM CHLORIDE: 9 INJECTION, SOLUTION INTRAVENOUS at 14:12

## 2023-02-14 RX ADMIN — FENTANYL CITRATE 50 MCG: 50 INJECTION INTRAMUSCULAR; INTRAVENOUS at 14:03

## 2023-02-14 RX ADMIN — HYDROMORPHONE HYDROCHLORIDE 0.5 MG: 1 INJECTION, SOLUTION INTRAMUSCULAR; INTRAVENOUS; SUBCUTANEOUS at 17:35

## 2023-02-14 RX ADMIN — MIDAZOLAM 1 MG: 1 INJECTION INTRAMUSCULAR; INTRAVENOUS at 14:03

## 2023-02-14 RX ADMIN — ONDANSETRON 4 MG: 2 INJECTION INTRAMUSCULAR; INTRAVENOUS at 16:24

## 2023-02-14 RX ADMIN — HYDROMORPHONE HYDROCHLORIDE 0.5 MG: 1 INJECTION, SOLUTION INTRAMUSCULAR; INTRAVENOUS; SUBCUTANEOUS at 17:07

## 2023-02-14 RX ADMIN — HEPARIN SODIUM 7000 UNITS: 1000 INJECTION INTRAVENOUS; SUBCUTANEOUS at 15:01

## 2023-02-14 RX ADMIN — PROTAMINE SULFATE 15 MG: 10 INJECTION, SOLUTION INTRAVENOUS at 16:31

## 2023-02-14 RX ADMIN — TRAZODONE HYDROCHLORIDE 50 MG: 50 TABLET ORAL at 23:14

## 2023-02-14 RX ADMIN — SODIUM CHLORIDE, POTASSIUM CHLORIDE, SODIUM LACTATE AND CALCIUM CHLORIDE: 600; 310; 30; 20 INJECTION, SOLUTION INTRAVENOUS at 16:40

## 2023-02-14 RX ADMIN — FERROUS SULFATE TAB EC 325 MG (65 MG FE EQUIVALENT) 325 MG: 325 (65 FE) TABLET DELAYED RESPONSE at 20:22

## 2023-02-14 RX ADMIN — FUROSEMIDE 20 MG: 20 TABLET ORAL at 20:22

## 2023-02-14 RX ADMIN — HYDROMORPHONE HYDROCHLORIDE 0.5 MG: 1 INJECTION, SOLUTION INTRAMUSCULAR; INTRAVENOUS; SUBCUTANEOUS at 17:18

## 2023-02-14 RX ADMIN — LIDOCAINE HYDROCHLORIDE 80 MG: 20 INJECTION, SOLUTION EPIDURAL; INFILTRATION; INTRACAUDAL at 14:03

## 2023-02-14 RX ADMIN — SUGAMMADEX 200 MG: 100 INJECTION, SOLUTION INTRAVENOUS at 16:40

## 2023-02-14 RX ADMIN — DEXAMETHASONE SODIUM PHOSPHATE 10 MG: 10 INJECTION, SOLUTION INTRAMUSCULAR; INTRAVENOUS at 14:31

## 2023-02-14 RX ADMIN — TAMSULOSIN HYDROCHLORIDE 0.8 MG: 0.4 CAPSULE ORAL at 20:22

## 2023-02-14 RX ADMIN — SODIUM CHLORIDE, PRESERVATIVE FREE 10 ML: 5 INJECTION INTRAVENOUS at 20:23

## 2023-02-14 RX ADMIN — MORPHINE SULFATE 2 MG: 2 INJECTION, SOLUTION INTRAMUSCULAR; INTRAVENOUS at 19:02

## 2023-02-14 RX ADMIN — PHENYLEPHRINE HYDROCHLORIDE 50 MCG/MIN: 10 INJECTION INTRAVENOUS at 14:20

## 2023-02-14 RX ADMIN — OXYBUTYNIN CHLORIDE 5 MG: 5 TABLET ORAL at 23:14

## 2023-02-14 RX ADMIN — Medication 2000 MG: at 14:19

## 2023-02-14 RX ADMIN — LISINOPRIL 40 MG: 40 TABLET ORAL at 20:22

## 2023-02-14 RX ADMIN — POTASSIUM CHLORIDE 10 MEQ: 1500 TABLET, EXTENDED RELEASE ORAL at 20:22

## 2023-02-14 RX ADMIN — SODIUM CHLORIDE, POTASSIUM CHLORIDE, SODIUM LACTATE AND CALCIUM CHLORIDE: 600; 310; 30; 20 INJECTION, SOLUTION INTRAVENOUS at 20:21

## 2023-02-14 RX ADMIN — PROPOFOL 160 MG: 10 INJECTION, EMULSION INTRAVENOUS at 14:03

## 2023-02-14 RX ADMIN — SODIUM CHLORIDE, POTASSIUM CHLORIDE, SODIUM LACTATE AND CALCIUM CHLORIDE: 600; 310; 30; 20 INJECTION, SOLUTION INTRAVENOUS at 10:36

## 2023-02-14 RX ADMIN — HYDROMORPHONE HYDROCHLORIDE 0.5 MG: 1 INJECTION, SOLUTION INTRAMUSCULAR; INTRAVENOUS; SUBCUTANEOUS at 17:56

## 2023-02-14 ASSESSMENT — PAIN DESCRIPTION - DESCRIPTORS
DESCRIPTORS: ACHING
DESCRIPTORS: SORE

## 2023-02-14 ASSESSMENT — PAIN - FUNCTIONAL ASSESSMENT: PAIN_FUNCTIONAL_ASSESSMENT: 0-10

## 2023-02-14 ASSESSMENT — PAIN SCALES - GENERAL
PAINLEVEL_OUTOF10: 2
PAINLEVEL_OUTOF10: 8
PAINLEVEL_OUTOF10: 7
PAINLEVEL_OUTOF10: 7
PAINLEVEL_OUTOF10: 8

## 2023-02-14 ASSESSMENT — PAIN DESCRIPTION - LOCATION
LOCATION: GROIN
LOCATION: HIP

## 2023-02-14 ASSESSMENT — LIFESTYLE VARIABLES: SMOKING_STATUS: 1

## 2023-02-14 ASSESSMENT — PAIN DESCRIPTION - ORIENTATION
ORIENTATION: LEFT
ORIENTATION: RIGHT;LEFT

## 2023-02-14 ASSESSMENT — ENCOUNTER SYMPTOMS: SHORTNESS OF BREATH: 0

## 2023-02-14 ASSESSMENT — PAIN DESCRIPTION - PAIN TYPE: TYPE: SURGICAL PAIN

## 2023-02-14 NOTE — ANESTHESIA PROCEDURE NOTES
Arterial Line:    An arterial line was placed using surface landmarks, in the OR for the following indication(s): Paulstephaniee Rawhit A 20 gauge (size) (length) (type) catheter was placed, into the right radial artery, secured by Tegaderm. Anesthesia type: General    Events:  patient tolerated procedure well with no complications. 2/14/2023 2:33 PM2/14/2023 2:33 PM  Anesthesiologist: Cristal Diaz MD  Performed: Anesthesiologist   Preanesthetic Checklist  Completed: patient identified, IV checked, site marked, risks and benefits discussed, surgical/procedural consents, equipment checked, pre-op evaluation, timeout performed, anesthesia consent given, oxygen available, monitors applied/VS acknowledged, fire risk safety assessment completed and verbalized and blood product R/B/A discussed and consented

## 2023-02-14 NOTE — BRIEF OP NOTE
Brief Postoperative Note      Patient: Lissa Bowden  YOB: 1945  MRN: [id number]    Date of Procedure: 2/14/2023    Pre-Op Diagnosis: Abdominal aortic aneurysm (AAA) without rupture, unspecified part [I71.40]  Left common iliac artery aneurysm    Post-Op Diagnosis: Same       Procedure(s):  Endovascular pair of abdominal aortic and left common iliac artery aneurysm with Endurant E2 S  Embolization of left internal iliac artery with 8 mm x 10 cm coil  Aortic and iliac limb angioplasty with Reliant balloon  Ultrasound-guided access bilateral common femoral artery with placement of Manta closure device  Diagnostic and completion angiography    Surgeon(s):  Wilma Rodas MD    Assistant:  First Assistant: Bryan Gray RN    Anesthesia: General    Estimated Blood Loss (mL): less than 50     Complications: None    Specimens:   * No specimens in log *    Implants:  Implant Name Type Inv.  Item Serial No.  Lot No. LRB No. Used Action   COIL EMB L10CM DIA8MM GWIRE 0.035IN 2D SAURAV SHP STD STRTCH - [id number] Vascular coils COIL EMB L10CM DIA8MM GWIRE 0.035IN 2D SAURAV SHP STD [employer] [id number] Left 1 Implanted   COIL EMB L10CM DIA6MM GWIRE 0035IN 2D SAURAV SHP STD STRTCH - [id number] Vascular coils COIL EMB L10CM DIA6MM GWIRE 0035IN 2D SAURAV SHP STD [employer] [id number] Left 1 Implanted   GRAFT EVAR L103MM [id number] CATH 18FR NIT HI DENS - [id number] Endografts GRAFT EVAR L103MM [id number] CATH 18FR NIT HI DENS [id number] [employer] [place] [employer]  Right 1 Implanted   GRAFT STENT TAPR LIMB [id number] MM 16 FR AAA C E ENDURANT II - [id number] Vascular grafts GRAFT STENT TAPR LIMB [id number] MM 16 FR AAA C E ENDURANT II [id number] [employer] [place] [employer]  Left 1 Implanted   GRAFT EVAR 14FR L124MM [id number] HI DENS MULTIFILAMENT - [id number] Endografts GRAFT EVAR 14FR L124MM [id number] HI DENS MULTIFILAMENT [id number] [employer] [place] [employer]  N/A 1 Implanted         Drains:   Urinary Catheter 02/14/23 Govind (Active)       Findings: Successful embolization of left internal iliac artery with exclusion of aortic and iliac aneurysms.     Electronically signed by Alysa Case MD on 2/14/2023 at 4:49 PM

## 2023-02-14 NOTE — H&P
Interval H&P Note    Pt Name: Soumya Garcia  MRN: [id number]  YOB: 1945  Date of evaluation: 2/14/2023      [x] I have reviewed the hardcopy Vascular Note by Dr Melody Castañeda dated 1/16/23 labeled in short chart for the Interval History and Physical note. [x] I have examined  Soumya Garcia  There are no changes to the patient who is scheduled for ABDOMINAL AORTIC ANEURYSM REPAIR ENDOVASCULAR by Andrew Barber MD FOR ABDOMINAL AORTIC ANEURYSM (AAA) WITHOUT RUPTURE, UNSPECIFIED PART [I71.40]. (refer to hardcopy note in short chart)  The patient denies new health changes, fever, chills, wheezing, cough, increased SOB, chest pain, open sores or wounds. +DM POC     Hx CAD and CHF, HTN and HLD  Follows with Dr Charles Galeazzi Required Cardiac Clearance and was seen by Alexander Last CNP 1/17/23 Isela Garcia has been evaluated and given cardiac clearance for EVAR - Hold ASA prior and take Toprol am of surgery. \" Today denies dizziness, lightheadedness, palpitations, syncope, increased SOB or chest pain  Last ASA 81mg 2/10/23      Past Medical History:     Past Medical History:   Diagnosis Date    AAA (abdominal aortic aneurysm)     Scheduled for surgery 2/14/23    Anxiety with depression     On Rx; wife passed away in June and has had issues with this since    Arthritis     Knees, foot    Kirby's esophagus     BPH (benign prostatic hyperplasia)     CAD (coronary artery disease)     Diabetes mellitus ([facility] [address] [facility]     Type 2, checks blood sugar twice daily, managed by PCP    Dizziness     Usually in the morning    History of blood transfusion 2022    [facility]; patient states he was admitted after a syncopal episode and received blood transfusions    Hospitalization or health care facility admission within last 6 months 09/2022    [facility] (blood in stool)    Hyperlipidemia     Hypertension     IBS (irritable bowel syndrome)     Alternates constipation and diarrhea    Neuropathy     Left leg and foot d/t herniated disc in spine per pt. Nodule of middle lobe of right lung 10/26/2022    5mm (found on CT)    Under care of team     Cardiology - Dr. Hina Farr        Past Surgical History:     Past Surgical History:   Procedure Laterality Date    CATARACT EXTRACTION  2012    COLONOSCOPY  08/2022    and EGD    CORONARY ANGIOPLASTY WITH STENT PLACEMENT  01/30/2007    Stent placed mid and distal LAD    CYST REMOVAL      From neck and from left hand    EYE SURGERY  2012    Macular hole surgery (Dr. Kenia Cooper)    [address] Right     SKIN BIOPSY  08/2021    Finger        Social History:     Tobacco:    reports that he has been smoking cigarettes. He has been smoking an average of .5 packs per day. He has never used smokeless tobacco.  Alcohol:      reports no history of alcohol use. Drug Use:  reports no history of drug use. Family History:     History reviewed. No pertinent family history. Vital signs: BP (!) 159/99   Pulse 60   Temp 98.6 °F (37 °C) (Temporal)   Resp 16   Ht 5' 6\" (1.676 m)   Wt 167 lb (75.8 kg)   SpO2 99%   BMI 26.95 kg/m²     Allergies:  Erythromycin and Sulfamethoxazole-trimethoprim    Medications:    Prior to Admission medications    Medication Sig Start Date End Date Taking? Authorizing Provider   metFORMIN (GLUCOPHAGE) 500 MG tablet Take 1 tablet by mouth in the morning and at bedtime 11/1/22   Historical Provider, MD   simvastatin (ZOCOR) 40 MG tablet Take 40 mg by mouth nightly    Historical Provider, MD   furosemide (LASIX) 20 MG tablet Take 20 mg by mouth daily 7/22/22   Historical Provider, MD   ALPRAZolam Marcellina Bullion) 0.25 MG tablet Take 0.25 mg by mouth nightly as needed.   Patient not taking: Reported on 2/14/2023    Historical Provider, MD   PARoxetine (PAXIL) 20 MG tablet Take 1 tablet by mouth daily 1/27/23   Historical Provider, MD   tamsulosin (FLOMAX) 0.4 MG capsule Take 0.8 mg by mouth daily 9/2/14   Historical Provider, MD   oxybutynin (DITROPAN-XL) 5 MG extended release tablet Take 5 mg by mouth daily 7/22/22   Historical Provider, MD   amLODIPine (NORVASC) 10 MG tablet Take 10 mg by mouth daily    Historical Provider, MD   metoprolol succinate (TOPROL XL) 100 MG extended release tablet Take 100 mg by mouth daily 7/22/22   Historical Provider, MD   lisinopril (PRINIVIL;ZESTRIL) 40 MG tablet Take 40 mg by mouth daily    Historical Provider, MD   traZODone (DESYREL) 50 MG tablet Take 50 mg by mouth at bedtime 3/17/22   Historical Provider, MD   nitroGLYCERIN (NITROSTAT) 0.4 MG SL tablet Place 0.4 mg under the tongue every 5 minutes as needed    Historical Provider, MD   potassium chloride (KLOR-CON M) 10 MEQ extended release tablet Take 10 mEq by mouth daily 7/22/22   Historical Provider, MD   aspirin 81 MG EC tablet Take 81 mg by mouth daily 3/17/22   Historical Provider, MD   omeprazole (PRILOSEC) 40 MG delayed release capsule Take 1 capsule by mouth daily 12/11/22   Historical Provider, MD   ferrous gluconate (FERGON) 324 (38 Fe) MG tablet Take 1 tablet by mouth daily 1/6/23   Historical Provider, MD   DULoxetine (CYMBALTA) 30 MG extended release capsule Take 30 mg by mouth nightly    Historical Provider, MD   escitalopram (LEXAPRO) 10 MG tablet Take 1 tablet by mouth daily 1/23/23   Historical Provider, MD   acetaminophen (TYLENOL) 325 MG tablet Take 650 mg by mouth every 6 hours as needed    Historical Provider, MD   cyanocobalamin 1000 MCG tablet Take 100 mcg by mouth daily    Historical Provider, MD   dicyclomine (BENTYL) 10 MG capsule Take 1 capsule by mouth 3 times daily as needed 1/18/23   Historical Provider, MD   famotidine (PEPCID) 40 MG tablet Take 1 tablet by mouth nightly as needed 1/18/23   Historical Provider, MD         This is a 68 y.o. male who is pleasant, cooperative, alert and oriented x3, in no acute distress. Heart: Heart sounds are normal.  HR 60 regular rate and rhythm without murmur, gallop or rub.    Lungs:  Normal respiratory effort with equal expansion, good air exchange, unlabored and clear to auscultation without wheezes or rales bilaterally   Abdomen: mild tenderness across lower abdomen soft, nondistended with bowel sounds . Labs:  Recent Labs     01/31/23  1042   HGB 13.3   HCT 44.4   WBC 3.4*   MCV 79.9*      *   K 4.5   CL 96*   CO2 28   BUN 7*   CREATININE 0.74   GLUCOSE 53*   INR 1.1   PROTIME 14.1   APTT 29.3   AST 14   ALT 8   LABALBU 4.6       No results for input(s): COVID19 in the last 720 hours.     DURAN Nguyen CNP  Electronically signed 2/14/2023 at 10:49 AM

## 2023-02-14 NOTE — ANESTHESIA PRE PROCEDURE
Department of Anesthesiology  Preprocedure Note       Name:  Alexey Luna   Age:  68 y.o.  :  1945                                          MRN:  [id number]         Date:  2023      Surgeon: Norm Franco):  Phil Victoria MD    Procedure: Procedure(s):  ABDOMINAL AORTIC ANEURYSM REPAIR ENDOVASCULAR    Medications prior to admission:   Prior to Admission medications    Medication Sig Start Date End Date Taking? Authorizing Provider   metFORMIN (GLUCOPHAGE) 500 MG tablet Take 1 tablet by mouth in the morning and at bedtime 22   Historical Provider, MD   simvastatin (ZOCOR) 40 MG tablet Take 40 mg by mouth nightly    Historical Provider, MD   furosemide (LASIX) 20 MG tablet Take 20 mg by mouth daily 22   Historical Provider, MD   ALPRAZolam Cindia Muss) 0.25 MG tablet Take 0.25 mg by mouth nightly as needed.   Patient not taking: Reported on 2023    Historical Provider, MD   PARoxetine (PAXIL) 20 MG tablet Take 1 tablet by mouth daily 23   Historical Provider, MD   tamsulosin (FLOMAX) 0.4 MG capsule Take 0.8 mg by mouth daily 14   Historical Provider, MD   oxybutynin (DITROPAN-XL) 5 MG extended release tablet Take 5 mg by mouth daily 22   Historical Provider, MD   amLODIPine (NORVASC) 10 MG tablet Take 10 mg by mouth daily    Historical Provider, MD   metoprolol succinate (TOPROL XL) 100 MG extended release tablet Take 100 mg by mouth daily 22   Historical Provider, MD   lisinopril (PRINIVIL;ZESTRIL) 40 MG tablet Take 40 mg by mouth daily    Historical Provider, MD   traZODone (DESYREL) 50 MG tablet Take 50 mg by mouth at bedtime 3/17/22   Historical Provider, MD   nitroGLYCERIN (NITROSTAT) 0.4 MG SL tablet Place 0.4 mg under the tongue every 5 minutes as needed    Historical Provider, MD   potassium chloride (KLOR-CON M) 10 MEQ extended release tablet Take 10 mEq by mouth daily 22   Historical Provider, MD   aspirin 81 MG EC tablet Take 81 mg by mouth daily 3/17/22   Historical Provider, MD   omeprazole (PRILOSEC) 40 MG delayed release capsule Take 1 capsule by mouth daily 12/11/22   Historical Provider, MD   ferrous gluconate (FERGON) 324 (38 Fe) MG tablet Take 1 tablet by mouth daily 1/6/23   Historical Provider, MD   DULoxetine (CYMBALTA) 30 MG extended release capsule Take 30 mg by mouth nightly    Historical Provider, MD   escitalopram (LEXAPRO) 10 MG tablet Take 1 tablet by mouth daily 1/23/23   Historical Provider, MD   acetaminophen (TYLENOL) 325 MG tablet Take 650 mg by mouth every 6 hours as needed    Historical Provider, MD   cyanocobalamin 1000 MCG tablet Take 100 mcg by mouth daily    Historical Provider, MD   dicyclomine (BENTYL) 10 MG capsule Take 1 capsule by mouth 3 times daily as needed 1/18/23   Historical Provider, MD   famotidine (PEPCID) 40 MG tablet Take 1 tablet by mouth nightly as needed 1/18/23   Historical Provider, MD       Current medications:    Current Facility-Administered Medications   Medication Dose Route Frequency Provider Last Rate Last Admin    lidocaine PF 1 % injection 1 mL  1 mL IntraDERmal Once PRN Les Cuellar MD        0.9 % sodium chloride infusion   IntraVENous Continuous Les Cuellar MD        lactated ringers IV soln infusion   IntraVENous Continuous Les Cuellar  mL/hr at 02/14/23 1036 New Bag at 02/14/23 1036    sodium chloride flush 0.9 % injection 5-40 mL  5-40 mL IntraVENous 2 times per day Les Cuellar MD        sodium chloride flush 0.9 % injection 5-40 mL  5-40 mL IntraVENous PRN Les Cuellar MD        0.9 % sodium chloride infusion   IntraVENous PRN Les Cuellar MD        ceFAZolin (ANCEF) 2000 mg in 0.9% sodium chloride 50 mL IVPB  2,000 mg IntraVENous Once Eris Kolb MD           Allergies: Allergies   Allergen Reactions    Erythromycin Hives    Sulfamethoxazole-Trimethoprim Nausea Only       Problem List:  There is no problem list on file for this patient.       Past Medical History:        Diagnosis Date    AAA (abdominal aortic aneurysm)     Scheduled for surgery 2/14/23    Anxiety with depression     On Rx; wife passed away in June and has had issues with this since    Arthritis     Knees, foot    Kirby's esophagus     BPH (benign prostatic hyperplasia)     CAD (coronary artery disease)     [facility]     Type 2, checks blood sugar twice daily, managed by PCP    Dizziness     Usually in the morning    History of blood transfusion 2022    [facility]; patient states he was admitted after a syncopal episode and received blood transfusions    Hospitalization or health care facility admission within last 6 months 09/2022    [facility] (blood in stool)    Hyperlipidemia     Hypertension     IBS (irritable bowel syndrome)     Alternates constipation and diarrhea    Neuropathy     Left leg and foot d/t herniated disc in spine per pt.     Nodule of middle lobe of right lung 10/26/2022    5mm (found on CT)    Under care of team     Cardiology - Dr. Wilder Banks       Past Surgical History:        Procedure Laterality Date    CATARACT EXTRACTION  2012    COLONOSCOPY  08/2022    and EGD    CORONARY ANGIOPLASTY WITH STENT PLACEMENT  01/30/2007    Stent placed mid and distal LAD    CYST REMOVAL      From neck and from left hand    EYE SURGERY  2012    Macular hole surgery (Dr. Harry Grimm)   [address] Right     SKIN BIOPSY  08/2021    Finger       Social History:    Social History     Tobacco Use    Smoking status: Some Days     Packs/day: 0.50     Types: Cigarettes    Smokeless tobacco: Never    Tobacco comments:     Pt. States he quit smoking for a while and started back up when his wife passed away in June 2022   Substance Use Topics    Alcohol use: Never                                Ready to quit: Not Answered  Counseling given: Not Answered  Tobacco comments: Pt. States he quit smoking for a while and started back up when his wife passed away in June 2022      Vital Signs (Current):   Vitals:    02/14/23 1011 02/14/23 1012   BP: (!) 159/99    Pulse: 60    Resp: 16    Temp: 98.6 °F (37 °C)    TempSrc: Temporal    SpO2: 99%    Weight:  167 lb (75.8 kg)   Height:  5' 6\" (1.676 m)                                              BP Readings from Last 3 Encounters:   02/14/23 (!) 159/99   01/31/23 130/86       NPO Status: Time of last liquid consumption: 1600                        Time of last solid consumption: 1600                        Date of last liquid consumption: 02/13/23                        Date of last solid food consumption: 02/13/23    BMI:   Wt Readings from Last 3 Encounters:   02/14/23 167 lb (75.8 kg)   01/31/23 167 lb (75.8 kg)     Body mass index is 26.95 kg/m². CBC:   Lab Results   Component Value Date/Time    WBC 3.4 01/31/2023 10:42 AM    RBC 5.56 01/31/2023 10:42 AM    HGB 13.3 01/31/2023 10:42 AM    HCT 44.4 01/31/2023 10:42 AM    MCV 79.9 01/31/2023 10:42 AM    RDW 21.2 01/31/2023 10:42 AM     01/31/2023 10:42 AM       CMP:   Lab Results   Component Value Date/Time     01/31/2023 10:42 AM    K 4.5 01/31/2023 10:42 AM    CL 96 01/31/2023 10:42 AM    CO2 28 01/31/2023 10:42 AM    BUN 7 01/31/2023 10:42 AM    CREATININE 0.74 01/31/2023 10:42 AM    LABGLOM >60 01/31/2023 10:42 AM    GLUCOSE 53 01/31/2023 10:42 AM    PROT 7.7 01/31/2023 10:42 AM    CALCIUM 9.0 01/31/2023 10:42 AM    BILITOT 0.3 01/31/2023 10:42 AM    ALKPHOS 74 01/31/2023 10:42 AM    AST 14 01/31/2023 10:42 AM    ALT 8 01/31/2023 10:42 AM       POC Tests:   Recent Labs     02/14/23  1026   POCGLU 115*       Coags:   Lab Results   Component Value Date/Time    PROTIME 14.1 01/31/2023 10:42 AM    INR 1.1 01/31/2023 10:42 AM    APTT 29.3 01/31/2023 10:42 AM       HCG (If Applicable): No results found for: PREGTESTUR, PREGSERUM, HCG, HCGQUANT     ABGs: No results found for: PHART, PO2ART, MMW6IZR, LJJ5DHL, BEART, A9XZKCKD     Type & Screen (If Applicable):   No results found for: Jasmin Phillips    Drug/Infectious Status (If Applicable):  No results found for: HIV, HEPCAB    COVID-19 Screening (If Applicable): No results found for: COVID19        Anesthesia Evaluation    Airway: Mallampati: I  TM distance: >3 FB   Neck ROM: full  Mouth opening: > = 3 FB   Dental:          Pulmonary:   (+) current smoker    (-) shortness of breath                           Cardiovascular:    (+) hypertension:, CABG/stent:,     (-)  angina and murmur      Rhythm: regular                      Neuro/Psych:               GI/Hepatic/Renal:             Endo/Other:    (+) Diabetes, . Abdominal:             Vascular:           Other Findings:           Anesthesia Plan      general     ASA 4                                   Mat MD Kali   2/14/2023

## 2023-02-14 NOTE — ANESTHESIA POSTPROCEDURE EVALUATION
Department of Anesthesiology  Postprocedure Note    Patient: Rhiannon Bob  MRN: [id number]  [place]: 1945  Date of evaluation: 2/14/2023      Procedure Summary     Date: 02/14/23 Room / Location: [facility] CATH LAB OR / [facility] - INPATIENT    Anesthesia Start: 3233 Anesthesia Stop: 4072    Procedure: ABDOMINAL AORTIC ANEURYSM REPAIR ENDOVASCULAR (Groin) Diagnosis:       Abdominal aortic aneurysm (AAA) without rupture, unspecified part      (Abdominal aortic aneurysm (AAA) without rupture, unspecified part [I71.40])    Surgeons: Ilene Bray MD Responsible Provider: Lucho Mantilla MD    Anesthesia Type: General ASA Status: 4          Anesthesia Type: General    Suman Phase I:      Suman Phase II:        Anesthesia Post Evaluation    Complications: no

## 2023-02-15 ENCOUNTER — APPOINTMENT (OUTPATIENT)
Dept: GENERAL RADIOLOGY | Age: 78
DRG: 272 | End: 2023-02-15
Attending: SURGERY
Payer: MEDICARE

## 2023-02-15 VITALS
HEART RATE: 85 BPM | DIASTOLIC BLOOD PRESSURE: 95 MMHG | HEIGHT: 66 IN | WEIGHT: 157.8 LBS | TEMPERATURE: 97.8 F | SYSTOLIC BLOOD PRESSURE: 120 MMHG | OXYGEN SATURATION: 95 % | RESPIRATION RATE: 16 BRPM | BODY MASS INDEX: 25.36 KG/M2

## 2023-02-15 LAB
GLUCOSE BLD-MCNC: 123 MG/DL (ref 75–110)
GLUCOSE BLD-MCNC: 126 MG/DL (ref 75–110)
GLUCOSE BLD-MCNC: 131 MG/DL (ref 75–110)
MAGNESIUM SERPL-MCNC: 1.8 MG/DL (ref 1.6–2.6)
PHOSPHATE SERPL-MCNC: 2.9 MG/DL (ref 2.5–4.5)

## 2023-02-15 PROCEDURE — 6370000000 HC RX 637 (ALT 250 FOR IP): Performed by: SURGERY

## 2023-02-15 PROCEDURE — 83735 ASSAY OF MAGNESIUM: CPT

## 2023-02-15 PROCEDURE — 2580000003 HC RX 258: Performed by: SURGERY

## 2023-02-15 PROCEDURE — 94761 N-INVAS EAR/PLS OXIMETRY MLT: CPT

## 2023-02-15 PROCEDURE — 36415 COLL VENOUS BLD VENIPUNCTURE: CPT

## 2023-02-15 PROCEDURE — 82947 ASSAY GLUCOSE BLOOD QUANT: CPT

## 2023-02-15 PROCEDURE — 6370000000 HC RX 637 (ALT 250 FOR IP): Performed by: NURSE PRACTITIONER

## 2023-02-15 PROCEDURE — 74019 RADEX ABDOMEN 2 VIEWS: CPT

## 2023-02-15 PROCEDURE — 84100 ASSAY OF PHOSPHORUS: CPT

## 2023-02-15 RX ADMIN — POTASSIUM CHLORIDE 10 MEQ: 1500 TABLET, EXTENDED RELEASE ORAL at 09:31

## 2023-02-15 RX ADMIN — ESCITALOPRAM OXALATE 10 MG: 10 TABLET ORAL at 09:30

## 2023-02-15 RX ADMIN — METOPROLOL SUCCINATE 100 MG: 50 TABLET, EXTENDED RELEASE ORAL at 09:29

## 2023-02-15 RX ADMIN — TAMSULOSIN HYDROCHLORIDE 0.8 MG: 0.4 CAPSULE ORAL at 09:31

## 2023-02-15 RX ADMIN — FUROSEMIDE 20 MG: 20 TABLET ORAL at 09:30

## 2023-02-15 RX ADMIN — SODIUM CHLORIDE, PRESERVATIVE FREE 10 ML: 5 INJECTION INTRAVENOUS at 09:45

## 2023-02-15 RX ADMIN — LISINOPRIL 40 MG: 40 TABLET ORAL at 09:32

## 2023-02-15 RX ADMIN — OXYBUTYNIN CHLORIDE 5 MG: 5 TABLET ORAL at 09:32

## 2023-02-15 RX ADMIN — PANTOPRAZOLE SODIUM 40 MG: 40 TABLET, DELAYED RELEASE ORAL at 05:10

## 2023-02-15 RX ADMIN — PAROXETINE HYDROCHLORIDE 20 MG: 20 TABLET, FILM COATED ORAL at 09:30

## 2023-02-15 RX ADMIN — FERROUS SULFATE TAB EC 325 MG (65 MG FE EQUIVALENT) 325 MG: 325 (65 FE) TABLET DELAYED RESPONSE at 09:30

## 2023-02-15 NOTE — CONSULTS
[facility].,    Adult Hospitalist      Name: Calista Fitzgerald  MRN: [id number]     Acct: [de-identified]  Room: 2042/2042-01    Admit Date: 2/14/2023  9:55 AM  PCP: Swapna Brantley MD    Primary Problem  Principal Problem:    Iliac artery aneurysm, left [facility])  Resolved Problems:    * No resolved hospital problems. *        Assesment:     Abdominal aortic aneurysm  Status post and endovascular aortic repair dated 2/14/2023  Coronary artery disease, native vessel  Congestive heart failure  Essential hypertension  Mixed hyperlipidemia  Kirby's esophagus  Diabetes mellitus type 2  Irritable bowel syndrome  Anxiety disorder  Major depressive disorder        Plan:     Admitted to CVTU  Monitor vitals closely  Keep SPO2 above 90%  I's and O's  IV fluids  Pain control  Antiemetics as needed  Resume essential home medication  Aspirin, Lipitor  Lexapro, Paxil, trazodone  Flomax  Lisinopril, metoprolol  Protonix  Family at bedside  CBC, BMP  Incentive spirometry  DVT and GI prophylaxis. Chief Complaint:     No chief complaint on file. Medical management    History of Present Illness:      Calista Fitzgerald is a 68 y.o.  male who presents with No chief complaint on file. Patient admitted after undergoing endovascular abdominal aortic repair without incident. Patient denies any chest pain, dyspnea or orthopnea. Denies nausea, vomiting or abdominal pain. Bilateral bandages are in place. Denies diarrhea or constipation. Denies rash or joint swelling. Denies dysuria or back pain    I have personally reviewed the past medical history, past surgical history, medications, social history, and family history, and summarized in the note. Review of Systems:     All 10 point system is reviewed and negative otherwise mentioned in HPI.       Past Medical History:     Past Medical History:   Diagnosis Date    AAA (abdominal aortic aneurysm)     Scheduled for surgery 2/14/23    Anxiety with depression     On Rx; wife passed away in June and has had issues with this since    Arthritis     Knees, foot    Kirby's esophagus     BPH (benign prostatic hyperplasia)     CAD (coronary artery disease)     Diabetes mellitus (HCC)     Type 2, checks blood sugar twice daily, managed by PCP    Dizziness     Usually in the morning    History of blood transfusion 2022    [facility]; patient states he was admitted after a syncopal episode and received blood transfusions    Hospitalization or health care facility admission within last 6 months 09/2022    [facility] (blood in stool)    Hyperlipidemia     Hypertension     IBS (irritable bowel syndrome)     Alternates constipation and diarrhea    Neuropathy     Left leg and foot d/t herniated disc in spine per pt. Nodule of middle lobe of right lung 10/26/2022    5mm (found on CT)    Under care of team     Cardiology - Dr. Demi Montiel        Past Surgical History:     Past Surgical History:   Procedure Laterality Date    CATARACT EXTRACTION  2012    COLONOSCOPY  08/2022    and EGD    CORONARY ANGIOPLASTY WITH STENT PLACEMENT  01/30/2007    Stent placed mid and distal LAD    CYST REMOVAL      From neck and from left hand    EYE SURGERY  2012    Macular hole surgery (Dr. Raul Gómez)    [address]     SKIN BIOPSY  08/2021    Finger        Medications Prior to Admission:       Prior to Admission medications    Medication Sig Start Date End Date Taking? Authorizing Provider   metFORMIN (GLUCOPHAGE) 500 MG tablet Take 1 tablet by mouth in the morning and at bedtime 11/1/22   Historical Provider, MD   simvastatin (ZOCOR) 40 MG tablet Take 40 mg by mouth nightly    Historical Provider, MD   furosemide (LASIX) 20 MG tablet Take 20 mg by mouth daily 7/22/22   Historical Provider, MD   ALPRAZolam Gabriel Ear) 0.25 MG tablet Take 0.25 mg by mouth nightly as needed.   Patient not taking: Reported on 2/14/2023    Historical Provider, MD   PARoxetine (PAXIL) 20 MG tablet Take 1 tablet by mouth daily 1/27/23   Historical Provider, MD   tamsulosin (FLOMAX) 0.4 MG capsule Take 0.8 mg by mouth daily 9/2/14   Historical Provider, MD   oxybutynin (DITROPAN-XL) 5 MG extended release tablet Take 5 mg by mouth daily 7/22/22   Historical Provider, MD   amLODIPine (NORVASC) 10 MG tablet Take 10 mg by mouth daily    Historical Provider, MD   metoprolol succinate (TOPROL XL) 100 MG extended release tablet Take 100 mg by mouth daily 7/22/22   Historical Provider, MD   lisinopril (PRINIVIL;ZESTRIL) 40 MG tablet Take 40 mg by mouth daily    Historical Provider, MD   traZODone (DESYREL) 50 MG tablet Take 50 mg by mouth at bedtime 3/17/22   Historical Provider, MD   nitroGLYCERIN (NITROSTAT) 0.4 MG SL tablet Place 0.4 mg under the tongue every 5 minutes as needed    Historical Provider, MD   potassium chloride (KLOR-CON M) 10 MEQ extended release tablet Take 10 mEq by mouth daily 7/22/22   Historical Provider, MD   aspirin 81 MG EC tablet Take 81 mg by mouth daily 3/17/22   Historical Provider, MD   omeprazole (PRILOSEC) 40 MG delayed release capsule Take 1 capsule by mouth daily 12/11/22   Historical Provider, MD   ferrous gluconate (FERGON) 324 (38 Fe) MG tablet Take 1 tablet by mouth daily 1/6/23   Historical Provider, MD   DULoxetine (CYMBALTA) 30 MG extended release capsule Take 30 mg by mouth nightly    Historical Provider, MD   escitalopram (LEXAPRO) 10 MG tablet Take 1 tablet by mouth daily 1/23/23   Historical Provider, MD   acetaminophen (TYLENOL) 325 MG tablet Take 650 mg by mouth every 6 hours as needed    Historical Provider, MD   cyanocobalamin 1000 MCG tablet Take 100 mcg by mouth daily    Historical Provider, MD   dicyclomine (BENTYL) 10 MG capsule Take 1 capsule by mouth 3 times daily as needed 1/18/23   Historical Provider, MD   famotidine (PEPCID) 40 MG tablet Take 1 tablet by mouth nightly as needed 1/18/23   Historical Provider, MD        Allergies:       Erythromycin and Sulfamethoxazole-trimethoprim    Social History:     Tobacco:    reports that he has been smoking cigarettes. He has been smoking an average of .5 packs per day. He has never used smokeless tobacco.  Alcohol:      reports no history of alcohol use. Drug Use:  reports no history of drug use. Family History:     History reviewed. No pertinent family history.       Physical Exam:     Vitals:  BP (!) 170/98   Pulse 77   Temp 97.3 °F (36.3 °C) (Temporal)   Resp 16   Ht 5' 6\" (1.676 m)   Wt 167 lb (75.8 kg)   SpO2 93%   BMI 26.95 kg/m²   Temp (24hrs), Av.6 °F (36.4 °C), Min:96.8 °F (36 °C), Max:98.6 °F (37 °C)          General appearance - alert, well appearing, and in no acute distress  Mental status - oriented to person, place, and time with normal affect  Head - normocephalic and atraumatic  Eyes - pupils equal and reactive, extraocular eye movements intact, conjunctiva clear  Ears - hearing appears to be intact  Nose - no drainage noted  Mouth - mucous membranes moist  Neck - supple, no carotid bruits, thyroid not palpable  Chest - clear to auscultation, normal effort  Heart - normal rate, regular rhythm, no murmur  Abdomen - soft, nontender, nondistended, bowel sounds present all four quadrants, no masses, hepatomegaly or splenomegaly  Neurological - normal speech, no focal findings or movement disorder noted, cranial nerves II through XII grossly intact  Extremities - peripheral pulses palpable, no pedal edema or calf pain with palpation  Skin - no gross lesions, rashes, or induration noted        Data:     Labs:    Hematology:  Recent Labs     23   WBC 6.1   RBC 5.23   HGB 13.1   HCT 43.6   MCV 83.4   MCH 25.0*   MCHC 30.0   RDW 21.1*   *   MPV 10.5     Chemistry:No results for input(s): NA, K, CL, CO2, GLUCOSE, BUN, CREATININE, MG, ANIONGAP, LABGLOM, GFRAA, CALCIUM, CAION, PHOS, PSA, PROBNP, TROPHS, CKTOTAL, CKMB, CKMBINDEX, MYOGLOBIN, DIGOXIN, LACTACIDWB in the last 72 hours. Recent Labs     02/14/23  1026 02/14/23  1813   POCGLU 115* 144*       Lab Results   Component Value Date    INR 1.1 01/31/2023    PROTIME 14.1 01/31/2023       No results found for: SPECIAL  No results found for: CULTURE    No results found for: POCPH, PHART, PH, POCPCO2, RBL1QQO, PCO2, POCPO2, PO2ART, PO2, POCHCO3, GWA0ARL, HCO3, NBEA, PBEA, BEART, BE, THGBART, THB, ZDK4IKX, XGNV4TFO, E7REGCTE, O2SAT, FIO2    Radiology:    No results found. All radiological studies reviewed                Code Status:  Full Code    Electronically signed by Devendra Hernandez MD on 2/14/2023 at 11:11 PM     Copy sent to Dr. Michael Cardoza MD    This note was created with the assistance of a speech-recognition program.  Although the intention is to generate a document that actually reflects the content of the visit, no guarantees can be provided that every mistake has been identified and corrected by editing. Note was updated later by me after  physical examination and  completion of the assessment.

## 2023-02-15 NOTE — PLAN OF CARE

## 2023-02-15 NOTE — PROGRESS NOTES
[facility]    Adult Hospitalist      Name: Devin Lloyd  MRN: [id number]     Acct: [de-identified]  Room: 2042/2042-01    Admit Date: 2/14/2023  9:55 AM  PCP: Mari Mukherjee MD    Primary Problem  Principal Problem:    Iliac artery aneurysm, left [facility])  Resolved Problems:    * No resolved hospital problems. *        Assesment:     Abdominal aortic aneurysm  Status post and endovascular aortic repair dated 2/14/2023  Coronary artery disease, native vessel  Congestive heart failure  Essential hypertension  Mixed hyperlipidemia  Kirby's esophagus  Diabetes mellitus type 2  Irritable bowel syndrome  Anxiety disorder  Major depressive disorder        Plan:     Admitted to CVTU  Monitor vitals closely  Keep SPO2 above 90%  I's and O's  IV fluids  Pain control  Antiemetics as needed  Resume essential home medication  Aspirin, Lipitor  Lexapro, Paxil, trazodone  Flomax  Lisinopril, metoprolol  Protonix  Family at bedside  CBC, BMP  Incentive spirometry  Okay to discharge once okay with vascular surgery  DVT and GI prophylaxis. Chief Complaint:     No chief complaint on file. Medical management    History of Present Illness:        Patient seen and examined at bedside  Last 24-hour events reviewed with nursing  Sitting up at bedside  Denies chest pain, dyspnea orthopnea  Denies nausea, vomiting or abdominal pain  Denies diarrhea or constipation  Ambulates with walker      Initial HPI  Devin Lloyd is a 68 y.o.  male who presents with No chief complaint on file. Patient admitted after undergoing endovascular abdominal aortic repair without incident. Patient denies any chest pain, dyspnea or orthopnea. Denies nausea, vomiting or abdominal pain. Bilateral bandages are in place. Denies diarrhea or constipation. Denies rash or joint swelling.   Denies dysuria or back pain    I have personally reviewed the past medical history, past surgical history, medications, social history, and family history, and summarized in the note. Review of Systems:     All 10 point system is reviewed and negative otherwise mentioned in HPI. Past Medical History:     Past Medical History:   Diagnosis Date    AAA (abdominal aortic aneurysm)     Scheduled for surgery 2/14/23    Anxiety with depression     On Rx; wife passed away in June and has had issues with this since    Arthritis     Knees, foot    Kirby's esophagus     BPH (benign prostatic hyperplasia)     CAD (coronary artery disease)     Diabetes mellitus ([facility] [address]     Type 2, checks blood sugar twice daily, managed by PCP    Dizziness     Usually in the morning    History of blood transfusion 2022    [facility]; patient states he was admitted after a syncopal episode and received blood transfusions    Hospitalization or health care facility admission within last 6 months 09/2022    [facility] (blood in stool)    Hyperlipidemia     Hypertension     IBS (irritable bowel syndrome)     Alternates constipation and diarrhea    Neuropathy     Left leg and foot d/t herniated disc in spine per pt. Nodule of middle lobe of right lung 10/26/2022    5mm (found on CT)    Under care of team     Cardiology - Dr. Francisco Shipman        Past Surgical History:     Past Surgical History:   Procedure Laterality Date    ABDOMINAL AORTIC ANEURYSM REPAIR, ENDOVASCULAR N/A 2/14/2023    ABDOMINAL AORTIC ANEURYSM REPAIR ENDOVASCULAR performed by Eris Kolb MD at [address] [place] [address]  2012    COLONOSCOPY  08/2022    and EGD    CORONARY ANGIOPLASTY WITH STENT PLACEMENT  01/30/2007    Stent placed mid and distal LAD    CYST REMOVAL      From neck and from left hand    EYE SURGERY  2012    Macular hole surgery (Dr. New Marie)    [address] Right     SKIN BIOPSY  08/2021    Finger        Medications Prior to Admission:       Prior to Admission medications    Medication Sig Start Date End Date Taking?  Authorizing Provider   metFORMIN (GLUCOPHAGE) 500 MG tablet Take 1 tablet by mouth in the morning and at bedtime 11/1/22   Historical Provider, MD   simvastatin (ZOCOR) 40 MG tablet Take 40 mg by mouth nightly    Historical Provider, MD   furosemide (LASIX) 20 MG tablet Take 20 mg by mouth daily 7/22/22   Historical Provider, MD   ALPRAZolam Dahlia Mech) 0.25 MG tablet Take 0.25 mg by mouth nightly as needed.   Patient not taking: Reported on 2/14/2023    Historical Provider, MD   PARoxetine (PAXIL) 20 MG tablet Take 1 tablet by mouth daily 1/27/23   Historical Provider, MD   tamsulosin (FLOMAX) 0.4 MG capsule Take 0.8 mg by mouth daily 9/2/14   Historical Provider, MD   oxybutynin (DITROPAN-XL) 5 MG extended release tablet Take 5 mg by mouth daily 7/22/22   Historical Provider, MD   amLODIPine (NORVASC) 10 MG tablet Take 10 mg by mouth daily    Historical Provider, MD   metoprolol succinate (TOPROL XL) 100 MG extended release tablet Take 100 mg by mouth daily 7/22/22   Historical Provider, MD   lisinopril (PRINIVIL;ZESTRIL) 40 MG tablet Take 40 mg by mouth daily    Historical Provider, MD   traZODone (DESYREL) 50 MG tablet Take 50 mg by mouth at bedtime 3/17/22   Historical Provider, MD   nitroGLYCERIN (NITROSTAT) 0.4 MG SL tablet Place 0.4 mg under the tongue every 5 minutes as needed    Historical Provider, MD   potassium chloride (KLOR-CON M) 10 MEQ extended release tablet Take 10 mEq by mouth daily 7/22/22   Historical Provider, MD   aspirin 81 MG EC tablet Take 81 mg by mouth daily 3/17/22   Historical Provider, MD   omeprazole (PRILOSEC) 40 MG delayed release capsule Take 1 capsule by mouth daily 12/11/22   Historical Provider, MD   ferrous gluconate (FERGON) 324 (38 Fe) MG tablet Take 1 tablet by mouth daily 1/6/23   Historical Provider, MD   DULoxetine (CYMBALTA) 30 MG extended release capsule Take 30 mg by mouth nightly    Historical Provider, MD   escitalopram (LEXAPRO) 10 MG tablet Take 1 tablet by mouth daily 1/23/23   Historical Provider, MD   acetaminophen (TYLENOL) 325 MG tablet Take 650 mg by mouth every 6 hours as needed    Historical Provider, MD   cyanocobalamin 1000 MCG tablet Take 100 mcg by mouth daily    Historical Provider, MD   dicyclomine (BENTYL) 10 MG capsule Take 1 capsule by mouth 3 times daily as needed 23   Historical Provider, MD   famotidine (PEPCID) 40 MG tablet Take 1 tablet by mouth nightly as needed 23   Historical Provider, MD        Allergies:       Erythromycin and Sulfamethoxazole-trimethoprim    Social History:     Tobacco:    reports that he has been smoking cigarettes. He has been smoking an average of .5 packs per day. He has never used smokeless tobacco.  Alcohol:      reports no history of alcohol use. Drug Use:  reports no history of drug use. Family History:     History reviewed. No pertinent family history.       Physical Exam:     Vitals:  BP (!) 120/95   Pulse 85   Temp 97.8 °F (36.6 °C) (Temporal)   Resp 16   Ht 5' 6\" (1.676 m)   Wt 157 lb 12.8 oz (71.6 kg)   SpO2 95%   BMI 25.47 kg/m²   Temp (24hrs), Av °F (36.7 °C), Min:97.3 °F (36.3 °C), Max:98.4 °F (36.9 °C)          General appearance - alert, well appearing, and in no acute distress  Mental status - oriented to person, place, and time with normal affect  Head - normocephalic and atraumatic  Eyes - pupils equal and reactive, extraocular eye movements intact, conjunctiva clear  Ears - hearing appears to be intact  Nose - no drainage noted  Mouth - mucous membranes moist  Neck - supple, no carotid bruits, thyroid not palpable  Chest - clear to auscultation, normal effort  Heart - normal rate, regular rhythm, no murmur  Abdomen - soft, nontender, nondistended, bowel sounds present all four quadrants, no masses, hepatomegaly or splenomegaly  Neurological - normal speech, no focal findings or movement disorder noted, cranial nerves II through XII grossly intact  Extremities - peripheral pulses palpable, no pedal edema or calf pain with palpation  Skin - no gross lesions, rashes, or induration noted        Data:     Labs:    Hematology:  Recent Labs     02/14/23  1919   WBC 6.1   RBC 5.23   HGB 13.1   HCT 43.6   MCV 83.4   MCH 25.0*   MCHC 30.0   RDW 21.1*   *   MPV 10.5       Chemistry:  Recent Labs     02/15/23  0311   MG 1.8   PHOS 2.9     Recent Labs     02/14/23  1026 02/14/23  1813 02/15/23  0939 02/15/23  1149 02/15/23  1655   POCGLU 115* 144* 126* 123* 131*         Lab Results   Component Value Date    INR 1.1 01/31/2023    PROTIME 14.1 01/31/2023       No results found for: SPECIAL  No results found for: CULTURE    No results found for: POCPH, PHART, PH, POCPCO2, RKG5GDP, PCO2, POCPO2, PO2ART, PO2, POCHCO3, WDY0YUO, HCO3, NBEA, PBEA, BEART, BE, THGBART, THB, LTQ0TOH, PYUZ8XMM, G6OLIMFK, O2SAT, FIO2    Radiology:    No results found. All radiological studies reviewed                Code Status:  Full Code    Electronically signed by Khushbu Grider MD on 2/15/2023 at 6:12 PM     Copy sent to Dr. Tennille Handley MD    This note was created with the assistance of a speech-recognition program.  Although the intention is to generate a document that actually reflects the content of the visit, no guarantees can be provided that every mistake has been identified and corrected by editing. Note was updated later by me after  physical examination and  completion of the assessment.

## 2023-02-15 NOTE — DISCHARGE SUMMARY
VASCULAR SURGERY   DISCHARGE SUMMARY      Patient Identification  Governor Hernandez is a 68 y.o. male. :  1945  Admit Date:  2023    Discharge date:   No discharge date for patient encounter. Disposition: home    Discharge Diagnoses:   Patient Active Problem List   Diagnosis    Iliac artery aneurysm, left (HCC)         Consults: IM    Surgery: Endovascular repair of abdominal aortic and large left common iliac artery aneurysm  Embolization of left internal iliac artery    Patient Instructions: Activity: no heavy lifting, pushing, pulling for 6 weeks, no driving for 2 weeks or while on analgesics  Diet: As tolerated  Follow-up with Cony Ramirez MD in 4 weeks. See pre-printed instructions in chart and given to patient upon discharge.     Discharge Medications:      Medication List      CONTINUE taking these medications     acetaminophen 325 MG tablet; Commonly known as: TYLENOL   amLODIPine 10 MG tablet; Commonly known as: NORVASC   aspirin 81 MG EC tablet   cyanocobalamin 1000 MCG tablet   dicyclomine 10 MG capsule; Commonly known as: BENTYL   DULoxetine 30 MG extended release capsule; Commonly known as: CYMBALTA   escitalopram 10 MG tablet; Commonly known as: LEXAPRO   famotidine 40 MG tablet; Commonly known as: PEPCID   ferrous gluconate 324 (38 Fe) MG tablet; Commonly known as: FERGON   furosemide 20 MG tablet; Commonly known as: LASIX   lisinopril 40 MG tablet; Commonly known as: PRINIVIL;ZESTRIL   metFORMIN 500 MG tablet; Commonly known as: GLUCOPHAGE   metoprolol succinate 100 MG extended release tablet; Commonly known as:   TOPROL XL   nitroGLYCERIN 0.4 MG SL tablet; Commonly known as: NITROSTAT   omeprazole 40 MG delayed release capsule; Commonly known as: PRILOSEC   oxybutynin 5 MG extended release tablet; Commonly known as: DITROPAN-XL   PARoxetine 20 MG tablet; Commonly known as: PAXIL   potassium chloride 10 MEQ extended release tablet; Commonly known as:   KLOR-CON M   simvastatin 40 MG tablet; Commonly known as: ZOCOR   tamsulosin 0.4 MG capsule; Commonly known as: FLOMAX   traZODone 50 MG tablet; Commonly known as: [address] [place] [address] your doctor about these medications     ALPRAZolam 0.25 MG tablet; Commonly known as: Bailey Ring          HPI and Hospital Course: 70-year-old male underwent endovascular repair of a small infrarenal abdominal aortic aneurysm and very large left common iliac artery aneurysm with embolization of the left internal iliac artery without incident. Normal postoperative course. He was discharged home on postoperative day #1 in good condition. Instructed to follow-up in the office in 4 weeks.         Makayla Couch MD MD FACS

## 2023-02-15 NOTE — FLOWSHEET NOTE
02/14/23 1904   Treatment Team Notification   Reason for Communication Evaluate; Review case   Team Member Name    Treatment Team Role Consulting Provider   Method of Communication Face to face   Response No new orders   Notification Time 1904     MD notified of new consult

## 2023-02-15 NOTE — FLOWSHEET NOTE
Patient admitted to room 2042 post AAA surgery, report received for [facility] RN. Family at bedside, vials in progress, orders to be reviewed & released.

## 2023-02-15 NOTE — PROGRESS NOTES
VASCULAR SURGERY  PROGRESS NOTE  POST-OP EVAR    2/15/2023  5:01 PM     William Soto    1945   [id number]        SUBJECTIVE:  Patient awake and alert. No complaints. Good pain control. Denies nausea. OBJECTIVE    Physical  VITALS:  BP (!) 120/95   Pulse 83   Temp 98.1 °F (36.7 °C) (Temporal)   Resp 20   Ht 5' 6\" (1.676 m)   Wt 157 lb 12.8 oz (71.6 kg)   SpO2 95%   BMI 25.47 kg/m²     CONSTITUTIONAL:  awake, alert, cooperative, no apparent distress and appears stated age  ABDOMEN: soft, non-tender, non-distended, incision sites C+D  EXTREMITIES: warm, no ischemia or edema  NEUROLOGIC:  Mental status unchanged. Motor and sensory function intact. Data  Hemoglobin   Date/Time Value Ref Range Status   02/14/2023 07:19 PM 13.1 13.0 - 17.0 g/dL Final     Hematocrit   Date/Time Value Ref Range Status   02/14/2023 07:19 PM 43.6 40.7 - 50.3 % Final     Sodium   Date/Time Value Ref Range Status   01/31/2023 10:42  (L) 135 - 144 mmol/L Final     Potassium   Date/Time Value Ref Range Status   01/31/2023 10:42 AM 4.5 3.7 - 5.3 mmol/L Final     Chloride   Date/Time Value Ref Range Status   01/31/2023 10:42 AM 96 (L) 98 - 107 mmol/L Final     CO2   Date/Time Value Ref Range Status   01/31/2023 10:42 AM 28 20 - 31 mmol/L Final     BUN   Date/Time Value Ref Range Status   01/31/2023 10:42 AM 7 (L) 8 - 23 mg/dL Final       ASSESSMENT AND PLAN    68 y.o. male doing well status post EVAR for large left common iliac artery aneurysm    Plan home today. Follow-up in Office. Continue aspirin. Call for problems.     Electronically signed by Gurjit Ballesteros MD on 2/15/2023 at 5:01 PM

## 2023-02-15 NOTE — CARE COORDINATION
02/15/23 1118   Service Assessment   Patient Orientation Alert and Oriented;Person;Place;Situation;Self   Cognition Alert   History Provided By Patient   Primary [address]   Patient's Healthcare Decision Maker is: Legal Next of Kin   PCP Verified by CM Yes   Last Visit to PCP Within last 3 months   Prior Functional Level Independent in ADLs/IADLs   Current Functional Level Independent in ADLs/IADLs   Can patient return to prior living arrangement Yes   Ability to make needs known: Good   Family able to assist with home care needs: Yes   Would you like for me to discuss the discharge plan with any other family members/significant others, and if so, who? No   Community Resources None   Social/Functional History   Lives With Alone   Type of [address] Two level   Bathroom Shower/Tub Tub/Shower unit   Bathroom Toilet Standard   Bathroom Accessibility Accessible   Home Equipment Cane   Receives Help From Family   ADL Assistance Independent   Homemaking Assistance Independent   Ambulation Assistance Independent   Transfer Assistance Independent   Active  Yes   Mode of Transportation Car   Discharge Planning   Type of [address] [place] [address] Prior To Admission None   Potential Assistance Needed N/A   DME Ordered? No   Type of Home Care Services None   Patient expects to be discharged to: House   Follow Up Appointment: Best Day/Time  Monday AM   One/Two Story Residence Split level   Lift Chair Available No   History of falls? 0   Services At/After Discharge   Transition of Care Consult (CM Consult) N/A   Services At/After Discharge None   [employer] Information Provided?  No   Mode of Transport at Discharge Other (see comment)  (family)   Confirm Follow Up Transport Family   Condition of Participation: Discharge Planning   The Plan for Transition of Care is related to the following treatment goals: home independently   The Patient and/or Patient Representative was provided with a Choice of Provider? Patient   The Patient and/Or Patient Representative agree with the Discharge Plan? Yes   Freedom of Choice list was provided with basic dialogue that supports the patient's individualized plan of care/goals, treatment preferences, and shares the quality data associated with the providers? Yes   AAA repair. Plan is home independently. Declines any skilled needs. Uses a cane at time. Continue to follow. Uses [facility] on [address].

## 2023-02-15 NOTE — OP NOTE
[id number] [facility] [address]                [address] [place] [address]                                OPERATIVE REPORT    PATIENT NAME: Cuauhtemoc Maharaj                         :        1945  MED REC NO:   [id number]                             ROOM:       2042  ACCOUNT NO:   [de-identified]                           ADMIT DATE: 2023  PROVIDER:     Edenilson Dunlap MD    DATE OF PROCEDURE:  2023    PREOPERATIVE DIAGNOSES:  1.  Large left common iliac artery aneurysm. 2.  Infrarenal abdominal aortic aneurysm. POSTOPERATIVE DIAGNOSES:  1.  Large left common iliac artery aneurysm. 2.  Infrarenal abdominal aortic aneurysm. OPERATION PERFORMED:  1. Endovascular repair of abdominal aortic and left common iliac artery  aneurysm with Endurant E2S, 25 mm x 14 mm x 103 mm main body device  (right, anterior). 2.  Placement of left iliac limb, Endurant 16 mm by 10 mm x 146 mm.  3.  Placement of right iliac limb, Endurant 16 mm by 10 mm x 124 mm. 4.  Coil embolization of the left internal iliac artery [place] Interlock  8 mm by 10 cm coil. 5.  Ultrasound-guided percutaneous access, bilateral common femoral  artery. 6.  Placement of bilateral Manta closure device. 7.  Aortic and iliac limb angioplasty with Reliant balloons. SURGEON:  Edenilson Dunlap MD    ANESTHESIA:  General endotracheal.    ESTIMATED BLOOD LOSS:  Less than 50 mL. COMPLICATIONS:  None. OPERATIVE INDICATIONS:  The patient is a 77-year-old male, who presents  with a very large left common iliac artery aneurysm and small infrarenal  abdominal aortic aneurysm. He also has stenosis involving the iliac  vessels especially at the origin of the left common iliac artery and  left internal iliac artery.   At this time, he is being taken to the  operating room for elective coil embolization of left internal iliac  artery and endovascular aortic and left common iliac artery aneurysm  repair. OPERATIVE TECHNIQUE:  After informed consent had been obtained, the  patient was brought to the cath lab, placed in the supine position, and  general endotracheal anesthesia was induced. The patient was then  prepped and draped in usual sterile fashion. Small skin nicks were then  made in both groins and under ultrasound guidance. The common femoral  arteries were isolated bilaterally and an 8-[place] sheaths placed. Vessels were noted to be patent. Images were obtained from the chart. A 5-[place] SOS Omni Flush catheter was advanced into the suprarenal  aorta from the right side and using 50% diluted Visipaque 320 contrast  digital imaging and abdominal aortography was performed. This  demonstrated a very large left common iliac artery aneurysm with  high-grade stenosis at the origin of the left common iliac artery. At  this time, a true guide was used to engage the origin of the left common  iliac artery and catheter and guidewire were advanced into the left  iliac system. The origin of the left internal iliac artery was  cannulated and a 5-[place] Combi catheter was advanced into the origin  and selective contrast angiography was performed. An 8 mm x 10 cm  [place] Interlock coil was then advanced into the origin of the left  internal iliac artery and the vessels were coil embolization. Next, a  0.035 GLIDE ADVANTAGE wire was advanced from the left side and due to  the high-grade stenosis at the origin of left common iliac artery at  angulation. The wire would not traverse the origin. Using the  preexisting sheath, a snare was advanced from the right side and the  wire was snared and brought into the distal aortic bifurcation and the  guidewire advanced into the aorta. Next, an Endurant 25 mm x 14 mm 103  mm E2S graft was advanced into the aorta and the image intensifier was  angled cranial in order to adjust for parallax.   Contrast angiography  was performed and the lowest left renal artery was marked. Main body  device was then opened and the contralateral limb was released followed  by the suprarenal fixation pins. The Omni flush catheter was brought  down and the origin of the gate was cannulated and catheter advanced,  which moved freely. Retrograde contrast angiography was performed via  left sheath injection and the iliac bifurcation was noted. An Endurant  60 mm x 10 mm x 146 mm graft was advanced from left side and deployed  into the left external iliac artery. The delivery system was withdrawn and the 12-[place] sheath was advanced. Attention was then turned to  the right side where the remainder of the main body device was then  deployed and the introducer slowly withdrawn. A 16-[place] sheath was  advanced. Retrograde contrast angiography was performed of the right in  the [place] projection and an Endurant 60 mm x 10 mm x 124 mm graft was  deployed from the right side to the right iliac bifurcation. Introducer  assembly was then withdrawn and Reliant balloon was used to iron out the  aortic portion of graft, as well as both iliac limbs. Stenosis at the  left common iliac origin was released with balloon angioplasty. Omni  flush catheter was replaced and completion angiography was performed  pulling back on both sheaths. This demonstrated excellent flow through  the graft without evidence of type 1 or type 2 endoleak. Catheters and  guidewires were then withdrawn and Manta closure devices were placed  bilaterally with good hemostasis. Skin nicks were re-approximated using  interrupted 4-0 Monocryl subcuticular suture followed by Dermabond. The  patient tolerated the procedure well and was transferred to the recovery  room in satisfactory condition. Sponge, instrument, and needle counts  were correct at the conclusion of the operation.         Jaydon Lyons MD    D: 02/14/2023 17:08:38       T: 02/14/2023 17:12:49     [email or web address]  Job#: [id number]     Doc#: [id number]    CC:  MD [facility]

## 2023-02-16 NOTE — PROGRESS NOTES
Discharge Note:     All discharge instructions given at this time. Patient belongings have been packed up by patient. Patient denies any further questions regarding discharge at this time. No new medications were added to patient's medication regimen. Pt denies any further issues at this time. Pt wheeled out to front discharge doors at this time. Pt left premises without any issues in private vehicle at this time.

## 2023-02-16 NOTE — PLAN OF CARE
Problem: Discharge Planning  Goal: Discharge to home or other facility with appropriate resources  Outcome: Adequate for Discharge  Flowsheets (Taken 2/15/2023 0800)  Discharge to home or other facility with appropriate resources:   Identify barriers to discharge with patient and caregiver   Refer to discharge planning if patient needs post-hospital services based on physician order or complex needs related to functional status, cognitive ability or social support system     Problem: Chronic Conditions and Co-morbidities  Goal: Patient's chronic conditions and co-morbidity symptoms are monitored and maintained or improved  Outcome: Adequate for Discharge  Flowsheets (Taken 2/15/2023 0800)  Care Plan - Patient's Chronic Conditions and Co-Morbidity Symptoms are Monitored and Maintained or Improved:   Monitor and assess patient's chronic conditions and comorbid symptoms for stability, deterioration, or improvement   Collaborate with multidisciplinary team to address chronic and comorbid conditions and prevent exacerbation or deterioration   Update acute care plan with appropriate goals if chronic or comorbid symptoms are exacerbated and prevent overall improvement and discharge     Problem: Pain  Goal: Verbalizes/displays adequate comfort level or baseline comfort level  Outcome: Adequate for Discharge  Flowsheets (Taken 2/15/2023 0800)  Verbalizes/displays adequate comfort level or baseline comfort level:   Encourage patient to monitor pain and request assistance   Assess pain using appropriate pain scale   Administer analgesics based on type and severity of pain and evaluate response   Implement non-pharmacological measures as appropriate and evaluate response     Problem: ABCDS Injury Assessment  Goal: Absence of physical injury  Outcome: Adequate for Discharge     Problem: Skin/Tissue Integrity  Goal: Absence of new skin breakdown  Description: 1. Monitor for areas of redness and/or skin breakdown  2.   Assess vascular access sites hourly  3. Every 4-6 hours minimum:  Change oxygen saturation probe site  4. Every 4-6 hours:  If on nasal continuous positive airway pressure, respiratory therapy assess nares and determine need for appliance change or resting period.   Outcome: Adequate for Discharge

## 2023-02-26 NOTE — PROGRESS NOTES
Physician Progress Note      PATIENT:               Deena Devlin  CSN #:                  [id number]  :                       1945  ADMIT DATE:       2023 9:55 AM  DISCH DATE:        2/15/2023 6:10 PM  RESPONDING  PROVIDER #:        Heidi Ga MD          QUERY TEXT:    Pt admitted with abdominal aortic aneurysm and left common iliac artery   aneurysm. Pt noted to have Hx of CHF and takes po Lasix. If possible, please   document in the discharge summary if you are evaluating and/or treating any   of the following: The medical record reflects the following:  Risk Factors: CAD, HTN, advanced age  Clinical Indicators: noted to have Hx of CHF  Treatment: oral lasix, metoprolol succinate, I&Os  Options provided:  -- Chronic Systolic CHF/HFrEF  -- Chronic Diastolic CHF/HFpEF  -- Chronic Systolic and Diastolic CHF  -- Other - I will add my own diagnosis  -- Disagree - Not applicable / Not valid  -- Disagree - Clinically unable to determine / Unknown  -- Refer to Clinical Documentation Reviewer    PROVIDER RESPONSE TEXT:    This patient has chronic diastolic CHF/HFpEF.     Query created by: Barbara Mcmullen on 2023 8:11 AM      Electronically signed by:  Heidi Ga MD 2023 6:50 PM

## 2023-09-21 LAB
BUN BLDV-MCNC: 11 MG/DL
CALCIUM SERPL-MCNC: 8.9 MG/DL
CHLORIDE BLD-SCNC: 97 MMOL/L
CO2: 26 MMOL/L
CREAT SERPL-MCNC: 0.9 MG/DL
GFR AFRICAN AMERICAN: 98.7 ML/M1.7
GFR NON-AFRICAN AMERICAN: 81.6 ML/M1.7
GLUCOSE: 77 MG/DL
POTASSIUM SERPL-SCNC: 5 MMOL/L
SODIUM BLD-SCNC: 134 MMOL/L

## 2024-08-16 ENCOUNTER — TELEPHONE (OUTPATIENT)
Dept: INTERVENTIONAL RADIOLOGY/VASCULAR | Age: 79
End: 2024-08-16

## 2024-08-30 ENCOUNTER — HOSPITAL ENCOUNTER (OUTPATIENT)
Dept: CT IMAGING | Age: 79
End: 2024-08-30
Payer: MEDICARE

## 2024-08-30 VITALS
BODY MASS INDEX: 28.25 KG/M2 | WEIGHT: 180 LBS | HEART RATE: 66 BPM | DIASTOLIC BLOOD PRESSURE: 76 MMHG | RESPIRATION RATE: 11 BRPM | TEMPERATURE: 97.2 F | HEIGHT: 67 IN | OXYGEN SATURATION: 98 % | SYSTOLIC BLOOD PRESSURE: 96 MMHG

## 2024-08-30 DIAGNOSIS — D64.9 ANEMIA, UNSPECIFIED TYPE: ICD-10-CM

## 2024-08-30 LAB
BASOPHILS # BLD: <0.03 K/UL (ref 0–0.2)
BASOPHILS NFR BLD: 1 % (ref 0–2)
EOSINOPHIL # BLD: 0.15 K/UL (ref 0–0.44)
EOSINOPHILS RELATIVE PERCENT: 4 % (ref 1–4)
ERYTHROCYTE [DISTWIDTH] IN BLOOD BY AUTOMATED COUNT: 13.8 % (ref 11.8–14.4)
HCT VFR BLD AUTO: 41.2 % (ref 40.7–50.3)
HGB BLD-MCNC: 13.9 G/DL (ref 13–17)
IMM GRANULOCYTES # BLD AUTO: 0.02 K/UL (ref 0–0.3)
IMM GRANULOCYTES NFR BLD: 1 %
IMM RETICS NFR: 14.8 % (ref 2.7–18.3)
INR PPP: 1.1
LYMPHOCYTES NFR BLD: 0.97 K/UL (ref 1.1–3.7)
LYMPHOCYTES RELATIVE PERCENT: 23 % (ref 24–43)
MCH RBC QN AUTO: 29.8 PG (ref 25.2–33.5)
MCHC RBC AUTO-ENTMCNC: 33.7 G/DL (ref 28.4–34.8)
MCV RBC AUTO: 88.2 FL (ref 82.6–102.9)
MONOCYTES NFR BLD: 0.36 K/UL (ref 0.1–1.2)
MONOCYTES NFR BLD: 9 % (ref 3–12)
NEUTROPHILS NFR BLD: 64 % (ref 36–65)
NEUTS SEG NFR BLD: 2.73 K/UL (ref 1.5–8.1)
NRBC BLD-RTO: 0 PER 100 WBC
PARTIAL THROMBOPLASTIN TIME: 28 SEC (ref 23.9–33.8)
PLATELET # BLD AUTO: 153 K/UL (ref 138–453)
PMV BLD AUTO: 10.2 FL (ref 8.1–13.5)
PROTHROMBIN TIME: 13.8 SEC (ref 11.5–14.2)
RBC # BLD AUTO: 4.67 M/UL (ref 4.21–5.77)
RETIC HEMOGLOBIN: 32 PG (ref 28.2–35.7)
RETICS # AUTO: 0.08 M/UL (ref 0.03–0.08)
RETICS/RBC NFR AUTO: 1.8 % (ref 0.5–1.9)
WBC OTHER # BLD: 4.3 K/UL (ref 3.5–11.3)

## 2024-08-30 PROCEDURE — 6360000002 HC RX W HCPCS: Performed by: RADIOLOGY

## 2024-08-30 PROCEDURE — 88364 INSITU HYBRIDIZATION (FISH): CPT

## 2024-08-30 PROCEDURE — 85730 THROMBOPLASTIN TIME PARTIAL: CPT

## 2024-08-30 PROCEDURE — 88184 FLOWCYTOMETRY/ TC 1 MARKER: CPT

## 2024-08-30 PROCEDURE — 85025 COMPLETE CBC W/AUTO DIFF WBC: CPT

## 2024-08-30 PROCEDURE — 85610 PROTHROMBIN TIME: CPT

## 2024-08-30 PROCEDURE — 88305 TISSUE EXAM BY PATHOLOGIST: CPT

## 2024-08-30 PROCEDURE — 88275 CYTOGENETICS 100-300: CPT

## 2024-08-30 PROCEDURE — 88341 IMHCHEM/IMCYTCHM EA ADD ANTB: CPT

## 2024-08-30 PROCEDURE — 88185 FLOWCYTOMETRY/TC ADD-ON: CPT

## 2024-08-30 PROCEDURE — 7100000011 HC PHASE II RECOVERY - ADDTL 15 MIN: Performed by: RADIOLOGY

## 2024-08-30 PROCEDURE — 88365 INSITU HYBRIDIZATION (FISH): CPT

## 2024-08-30 PROCEDURE — 7100000010 HC PHASE II RECOVERY - FIRST 15 MIN: Performed by: RADIOLOGY

## 2024-08-30 PROCEDURE — 88264 CHROMOSOME ANALYSIS 20-25: CPT

## 2024-08-30 PROCEDURE — 88280 CHROMOSOME KARYOTYPE STUDY: CPT

## 2024-08-30 PROCEDURE — 88313 SPECIAL STAINS GROUP 2: CPT

## 2024-08-30 PROCEDURE — 88311 DECALCIFY TISSUE: CPT

## 2024-08-30 PROCEDURE — 85045 AUTOMATED RETICULOCYTE COUNT: CPT

## 2024-08-30 PROCEDURE — 88360 TUMOR IMMUNOHISTOCHEM/MANUAL: CPT

## 2024-08-30 PROCEDURE — 88237 TISSUE CULTURE BONE MARROW: CPT

## 2024-08-30 PROCEDURE — 88342 IMHCHEM/IMCYTCHM 1ST ANTB: CPT

## 2024-08-30 PROCEDURE — 2709999900 CT BIOPSY BONE MARROW

## 2024-08-30 PROCEDURE — 2580000003 HC RX 258: Performed by: RADIOLOGY

## 2024-08-30 PROCEDURE — 88271 CYTOGENETICS DNA PROBE: CPT

## 2024-08-30 RX ORDER — MIDAZOLAM HYDROCHLORIDE 1 MG/ML
INJECTION INTRAMUSCULAR; INTRAVENOUS PRN
Status: COMPLETED | OUTPATIENT
Start: 2024-08-30 | End: 2024-08-30

## 2024-08-30 RX ORDER — SODIUM CHLORIDE 9 MG/ML
INJECTION, SOLUTION INTRAVENOUS CONTINUOUS
Status: ACTIVE | OUTPATIENT
Start: 2024-08-30

## 2024-08-30 RX ORDER — SODIUM CHLORIDE 9 MG/ML
INJECTION, SOLUTION INTRAVENOUS PRN
Status: ACTIVE | OUTPATIENT
Start: 2024-08-30

## 2024-08-30 RX ORDER — FENTANYL CITRATE 0.05 MG/ML
INJECTION, SOLUTION INTRAMUSCULAR; INTRAVENOUS PRN
Status: COMPLETED | OUTPATIENT
Start: 2024-08-30 | End: 2024-08-30

## 2024-08-30 RX ORDER — SODIUM CHLORIDE 0.9 % (FLUSH) 0.9 %
5-40 SYRINGE (ML) INJECTION PRN
Status: ACTIVE | OUTPATIENT
Start: 2024-08-30

## 2024-08-30 RX ORDER — SODIUM CHLORIDE 0.9 % (FLUSH) 0.9 %
5-40 SYRINGE (ML) INJECTION EVERY 12 HOURS SCHEDULED
Status: ACTIVE | OUTPATIENT
Start: 2024-08-30

## 2024-08-30 RX ADMIN — MIDAZOLAM 0.5 MG: 1 INJECTION INTRAMUSCULAR; INTRAVENOUS at 11:22

## 2024-08-30 RX ADMIN — SODIUM CHLORIDE: 9 INJECTION, SOLUTION INTRAVENOUS at 09:42

## 2024-08-30 RX ADMIN — FENTANYL CITRATE 50 MCG: 0.05 INJECTION, SOLUTION INTRAMUSCULAR; INTRAVENOUS at 11:22

## 2024-08-30 ASSESSMENT — PAIN - FUNCTIONAL ASSESSMENT
PAIN_FUNCTIONAL_ASSESSMENT: NONE - DENIES PAIN
PAIN_FUNCTIONAL_ASSESSMENT: 0-10

## 2024-08-30 ASSESSMENT — PAIN DESCRIPTION - DESCRIPTORS: DESCRIPTORS: ACHING

## 2024-08-30 NOTE — DISCHARGE INSTRUCTIONS
Watch for signs and symptoms of infection including fever, chills, headache, vomiting.  Watch for increased redness, swelling or drainage at the site.  Watch for any increased weakness or numbness to the legs.  May remove dressing and shower tomorrow.  Soap and water only.  No soaking under water of any kind for 3-5 days.  No heating pads to lower back for 3 days.  Ice is ok.  May resume normal diet and activity.  No heavy lifting for 24 hours.  Call doctor for any complications from procedure.

## 2024-08-30 NOTE — BRIEF OP NOTE
Brief Postoperative Note    Derek Anders  YOB: 1945  [id number]    Pre-operative Diagnosis: Abnormal blood work    Post-operative Diagnosis: Same    Procedure: CT guided Random bone marrow biopsy    Anesthesia: Moderate Sedation Sedation    Surgeons/Assistants: Shar    Estimated Blood Loss: less than 50     Complications: None    Specimens: Was Obtained: single 11 G core and aspirate    Electronically signed by Kely Myles MD on 8/30/2024 at 11:34 AM

## 2024-08-30 NOTE — POST SEDATION
Sedation Post Procedure Note    Patient Name: Derek Anders   YOB: 1945  Room/Bed: Room/bed info not found  Medical Record Number: [id number]  Date: 8/30/2024   Time: 11:33 AM         Physicians/Assistants: Kely Myles MD, MD    Procedure Performed:  CT guided Random bone marrow biopsy    Post-Sedation Vital Signs:  Vitals:    08/30/24 1125   BP: 95/69   Pulse: 68   Resp: 14   Temp:    SpO2: 99%      Vital signs were reviewed and were stable after the procedure (see flow sheet for vitals)            Complications: none    Electronically signed by Kely Myles MD on 8/30/2024 at 11:33 AM

## 2024-08-30 NOTE — PRE SEDATION
Sedation Pre-Procedure Note    Patient Name: Derek Anders   YOB: 1945  Room/Bed: Room/bed info not found  Medical Record Number: [id number]  Date: 8/30/2024   Time: 10:42 AM       Indication:  Abnormal blood work    Consent: I have discussed with the patient and/or the patient representative the indication, alternatives, and the possible risks and/or complications of the planned procedure and the anesthesia methods. The patient and/or patient representative appear to understand and agree to proceed.    Vital Signs:   Vitals:    08/30/24 0923   BP: (!) 146/97   Pulse: 73   Resp: 18   Temp: 97.5 °F (36.4 °C)   SpO2: 93%       Past Medical History:   has a past medical history of AAA (abdominal aortic aneurysm) (HCC), Anxiety with depression, Arthritis, Kirby's esophagus, BPH (benign prostatic hyperplasia), CAD (coronary artery disease), Diabetes mellitus (HCC), Dizziness, History of blood transfusion, Hospitalization or health care facility admission within last 6 months, Hyperlipidemia, Hypertension, IBS (irritable bowel syndrome), Neuropathy, Nodule of middle lobe of right lung, and Under care of team.    Past Surgical History:   has a past surgical history that includes Coronary angioplasty with stent (01/30/2007); cyst removal; Rotator cuff repair (Right); Hemorrhoid surgery; hernia repair; eye surgery (2012); Cataract extraction (2012); skin biopsy (08/2021); Colonoscopy (08/2022); and AAA repair, endovascular (N/A, 2/14/2023).    Medications:   Scheduled Meds:    sodium chloride flush  5-40 mL IntraVENous 2 times per day     Continuous Infusions:    sodium chloride      sodium chloride 50 mL/hr at 08/30/24 0942     PRN Meds: sodium chloride flush, sodium chloride  Home Meds:   Prior to Admission medications    Medication Sig Start Date End Date Taking? Authorizing Provider   metFORMIN (GLUCOPHAGE) 500 MG tablet Take 1 tablet by mouth in the morning and at bedtime 11/1/22  Yes Provider, MD Bhupinder  tablet by mouth nightly as needed 1/18/23  Yes Bhupinder Narayanan MD   ALPRAZolam (XANAX) 0.25 MG tablet Take 0.25 mg by mouth nightly as needed.  Patient not taking: Reported on 2/14/2023    Bhupinder Narayanan MD   aspirin 81 MG EC tablet Take 1 tablet by mouth daily 3/17/22   Bhupinder Narayanan MD   dicyclomine (BENTYL) 10 MG capsule Take 1 capsule by mouth 3 times daily as needed 1/18/23   Bhupinder Narayanan MD     Coumadin Use Last 7 Days:  no  Antiplatelet drug therapy use last 7 days: no  Other anticoagulant use last 7 days: no  Additional Medication Information:  see [facility]      Pre-Sedation Documentation and Exam:   I have reviewed the patient's history and review of systems.    Mallampati Airway Assessment:  Mallampati Class II - (soft palate, fauces & uvula are visible)    Prior History of Anesthesia Complications:   none    ASA Classification:  Class 2 - A normal healthy patient with mild systemic disease    Sedation/ Anesthesia Plan:   intravenous sedation    Medications Planned:   midazolam (Versed) intravenously and fentanyl intravenously    Patient is an appropriate candidate for plan of sedation: yes    Electronically signed by Kely Myles MD on 8/30/2024 at 10:42 AM

## 2024-08-30 NOTE — H&P
History and Physical Service   [facility]    HISTORY AND PHYSICAL EXAMINATION            Date of Evaluation: 8/30/2024  Patient name:  Derek Anders  MRN:   [id number]  YOB: 1945  PCP:    Leticia Davidson MD    History Obtained From:     Patient, medical records    History of Present Illness:     This is Derek Anders a 79 y.o. male who presents today for a CT GUIDED BONE MARROW BIOPSY  by  .  .      Denies fever, chills, shortness of breath, cough, congestion, wheezing, chest pain, open sores or wounds.  HE TAKES ASA 81 MG LAST DOSE SATURDAY 8/24/2024 HE HAS DIABETES.REPROTS BLOOD SUGAR THIS .  Past Medical History:     Past Medical History:   Diagnosis Date    AAA (abdominal aortic aneurysm)     Scheduled for surgery 2/14/23    Anxiety with depression     On Rx; wife passed away in June and has had issues with this since    Arthritis     Knees, foot    Kirby's esophagus     BPH (benign prostatic hyperplasia)     CAD (coronary artery disease)     Diabetes mellitus (HCC)     Type 2, checks blood sugar twice daily, managed by PCP    Dizziness     Usually in the morning    History of blood transfusion 2022    [facility]; patient states he was admitted after a syncopal episode and received blood transfusions    Hospitalization or health care facility admission within last 6 months 09/2022    [facility] (blood in stool)    Hyperlipidemia     Hypertension     IBS (irritable bowel syndrome)     Alternates constipation and diarrhea    Neuropathy     Left leg and foot d/t herniated disc in spine per pt.    Nodule of middle lobe of right lung 10/26/2022    5mm (found on CT)    Under care of team     Cardiology - Dr. Wells        Past Surgical History:     Past Surgical History:   Procedure Laterality Date    ABDOMINAL AORTIC ANEURYSM REPAIR, ENDOVASCULAR N/A 2/14/2023    ABDOMINAL AORTIC ANEURYSM REPAIR ENDOVASCULAR performed by Otis Cade MD at [facility] OR    CATARACT EXTRACTION

## 2024-09-04 LAB
MISCELLANEOUS LAB TEST RESULT: NORMAL
TEST NAME: NORMAL

## 2024-09-05 LAB
BONE MARROW REPORT: NORMAL
FLOW CYTOMETRY, BM: NORMAL

## 2024-09-10 LAB — CHROMOSOME STUDY: NORMAL

## 2024-09-13 LAB
MISCELLANEOUS LAB TEST RESULT: NORMAL
TEST NAME: NORMAL

## (undated) DEVICE — SET INTRO SHTH 8FR L11CM 0.035IN J TIP GWIRE SIL ROT SUT

## (undated) DEVICE — PERCUTANEOUS ENTRY THINWALL NEEDLE  ONE-PART: Brand: COOK

## (undated) DEVICE — Device

## (undated) DEVICE — GUIDEWIRE VASC L260CM DIA0.035IN TIP L7CM PTFE S STL STR

## (undated) DEVICE — RADIFOCUS TORQUE DEVICE MULTI-TORQUE VISE: Brand: RADIFOCUS TORQUE DEVICE

## (undated) DEVICE — BAG TRASH WST 122 CM 183 CM 1400 CC FEMALE LUER PVC DEPOT

## (undated) DEVICE — SHEATH INTRO 12FR L28CM 0.035IN GWIRE HYDRPHLC LOK

## (undated) DEVICE — TOWEL,OR,DSP,ST,BLUE,DLX,XR,4/PK,20PK/CS: Brand: MEDLINE

## (undated) DEVICE — CATHETER PTCA 8FR L100CM BLLN DIA10-46MM SHTH 12FR GWIRE

## (undated) DEVICE — ELECTRODE ES L3IN S STL BLDE INSUL DISP VALLEYLAB EDGE

## (undated) DEVICE — PRESSURE MONITORING SET: Brand: TRUWAVE

## (undated) DEVICE — SYRINGE 20ML LL S/C 50

## (undated) DEVICE — SUTURE VCRL SZ 2-0 L36IN ABSRB UD L36MM CT-1 1/2 CIR J945H

## (undated) DEVICE — SET CATH 20GA L1.75IN RAD ART POLYUR RADPQ W/ INTEGR

## (undated) DEVICE — DEVICE CLOSURE VASCULAR MANTA 14FR

## (undated) DEVICE — ADHESIVE SKIN CLOSURE TOP 36 CC HI VISC DERMBND MINI

## (undated) DEVICE — SUTURE PROL SZ 5-0 L30IN NONABSORBABLE BLU L13MM RB-2 1/2 8710H

## (undated) DEVICE — APPLICATOR MEDICATED 26 CC SOLUTION HI LT ORNG CHLORAPREP

## (undated) DEVICE — KIT HND CTRL 3 W STPCOCK ROT END 54IN PREM HI PRSS TBNG AT

## (undated) DEVICE — SUTURE VCRL SZ 3-0 L36IN ABSRB UD L36MM CT-1 1/2 CIR J944H

## (undated) DEVICE — KIT SNR L120CM LOOP DIA20MM CATH 6FR L102CM NIT CBL G PLT

## (undated) DEVICE — RADIFOCUS GLIDEWIRE: Brand: GLIDEWIRE

## (undated) DEVICE — CATHETER GUID 5FR L70CM 0.038IN W/O HYDRPHLC COAT RADPQ

## (undated) DEVICE — GLOVE SURG SZ 8 L11.77IN FNGR THK9.8MIL STRW LTX POLYMER

## (undated) DEVICE — SHEATH INTRO 16FR L28CM 0.035IN GWIRE HYDRPHLC LOK

## (undated) DEVICE — TOTAL TRAY, DB, 100% SILI FOLEY, 16FR 10: Brand: MEDLINE

## (undated) DEVICE — SET ADMIN 25ML L117IN PMP MOD CK VLV RLER CLMP 2 SMRTSITE

## (undated) DEVICE — RADIFOCUS GLIDEWIRE ADVANTAGE GUIDEWIRE: Brand: GLIDEWIRE ADVANTAGE

## (undated) DEVICE — SUTURE PERMA-HAND SZ 2-0 L30IN NONABSORBABLE BLK L26MM SH K833H

## (undated) DEVICE — GAUZE,SPONGE,4"X4",16PLY,XRAY,STRL,LF: Brand: MEDLINE

## (undated) DEVICE — SYRINGE CNTRST 10ML LT BLU W/ CLR POLYCARB BRL MEDALLION

## (undated) DEVICE — SYRINGE MED 50ML LUERLOCK TIP

## (undated) DEVICE — SOLUTION IV 1000ML 0.9% SOD CHL PH 5 INJ USP VIAFLX PLAS

## (undated) DEVICE — DEVICE CLOSURE VASCULAR MANTA 18FR

## (undated) DEVICE — SOLUTION IV 500ML 0.9% SOD CHL PH 5 INJ USP VIAFLX PLAS

## (undated) DEVICE — SYRINGE, LUER SLIP, STERILE, 3ML: Brand: MEDLINE

## (undated) DEVICE — CATHETER ANGIO 5FR L65CM 0.038IN KMP SEL SFT RADPQ TIP HI

## (undated) DEVICE — SUTURE VCRL SZ 3-0 L54IN ABSRB UD LIGAPAK REEL POLYGLACTIN J285G

## (undated) DEVICE — SUTURE MCRYL SZ 4-0 L27IN ABSRB UD L19MM PS-2 1/2 CIR PRIM Y426H

## (undated) DEVICE — GAUZE, BORDER, 3"X6", 1.5"X4"PAD, STERIL: Brand: MEDLINE INDUSTRIES, INC.

## (undated) DEVICE — BLANKET WRM W29.9XL79.1IN UP BODY FORC AIR MISTRAL-AIR

## (undated) DEVICE — SURGICAL PROCEDURE TRAY CRD CATH SVMMC